# Patient Record
Sex: MALE | Race: WHITE | NOT HISPANIC OR LATINO | Employment: OTHER | URBAN - METROPOLITAN AREA
[De-identification: names, ages, dates, MRNs, and addresses within clinical notes are randomized per-mention and may not be internally consistent; named-entity substitution may affect disease eponyms.]

---

## 2019-07-05 ENCOUNTER — TELEPHONE (OUTPATIENT)
Dept: FAMILY MEDICINE CLINIC | Facility: CLINIC | Age: 74
End: 2019-07-05

## 2019-07-05 DIAGNOSIS — R19.5 FECAL OCCULT BLOOD TEST POSITIVE: Primary | ICD-10-CM

## 2019-07-05 RX ORDER — DULAGLUTIDE 1.5 MG/.5ML
INJECTION, SOLUTION SUBCUTANEOUS
Refills: 0 | COMMUNITY
Start: 2019-05-10 | End: 2019-07-16 | Stop reason: SINTOL

## 2019-07-05 RX ORDER — PANTOPRAZOLE SODIUM 40 MG/1
TABLET, DELAYED RELEASE ORAL
Refills: 3 | COMMUNITY
Start: 2019-05-09 | End: 2020-01-17 | Stop reason: ALTCHOICE

## 2019-07-05 RX ORDER — FLASH GLUCOSE SCANNING READER
EACH MISCELLANEOUS
Refills: 0 | COMMUNITY
Start: 2019-05-09

## 2019-07-05 RX ORDER — FLASH GLUCOSE SENSOR
KIT MISCELLANEOUS
Refills: 5 | COMMUNITY
Start: 2019-06-25 | End: 2019-09-20 | Stop reason: SDUPTHER

## 2019-07-05 RX ORDER — AMLODIPINE BESYLATE 5 MG/1
TABLET ORAL
Refills: 3 | COMMUNITY
Start: 2019-05-09 | End: 2020-02-07 | Stop reason: ALTCHOICE

## 2019-07-05 RX ORDER — LEVOTHYROXINE SODIUM 0.15 MG/1
TABLET ORAL
Refills: 3 | COMMUNITY
Start: 2019-05-09 | End: 2020-05-26

## 2019-07-05 RX ORDER — METOPROLOL TARTRATE 100 MG/1
TABLET ORAL
Refills: 3 | COMMUNITY
Start: 2019-05-30 | End: 2019-08-26 | Stop reason: SDUPTHER

## 2019-07-05 RX ORDER — GLIPIZIDE 10 MG/1
TABLET ORAL
Refills: 3 | COMMUNITY
Start: 2019-05-09 | End: 2020-01-17

## 2019-07-05 RX ORDER — TRAMADOL HYDROCHLORIDE 50 MG/1
TABLET ORAL
Refills: 5 | COMMUNITY
Start: 2019-06-22 | End: 2019-07-16 | Stop reason: SDUPTHER

## 2019-07-05 RX ORDER — CLOTRIMAZOLE AND BETAMETHASONE DIPROPIONATE 10; .64 MG/G; MG/G
CREAM TOPICAL
Refills: 3 | COMMUNITY
Start: 2019-07-01 | End: 2019-07-16 | Stop reason: ALTCHOICE

## 2019-07-16 ENCOUNTER — OFFICE VISIT (OUTPATIENT)
Dept: FAMILY MEDICINE CLINIC | Facility: CLINIC | Age: 74
End: 2019-07-16
Payer: MEDICARE

## 2019-07-16 VITALS
HEART RATE: 71 BPM | DIASTOLIC BLOOD PRESSURE: 80 MMHG | BODY MASS INDEX: 34.21 KG/M2 | OXYGEN SATURATION: 97 % | RESPIRATION RATE: 22 BRPM | HEIGHT: 67 IN | SYSTOLIC BLOOD PRESSURE: 148 MMHG | TEMPERATURE: 97.8 F | WEIGHT: 218 LBS

## 2019-07-16 DIAGNOSIS — Z86.010 HX OF COLONIC POLYPS: Primary | ICD-10-CM

## 2019-07-16 DIAGNOSIS — E66.09 CLASS 1 OBESITY DUE TO EXCESS CALORIES WITH SERIOUS COMORBIDITY AND BODY MASS INDEX (BMI) OF 34.0 TO 34.9 IN ADULT: ICD-10-CM

## 2019-07-16 DIAGNOSIS — R21 GROIN RASH: ICD-10-CM

## 2019-07-16 DIAGNOSIS — G89.29 CHRONIC LEFT-SIDED LOW BACK PAIN WITH LEFT-SIDED SCIATICA: ICD-10-CM

## 2019-07-16 DIAGNOSIS — I25.2 HISTORY OF ACUTE MYOCARDIAL INFARCTION: ICD-10-CM

## 2019-07-16 DIAGNOSIS — E11.65 TYPE 2 DIABETES MELLITUS WITH HYPERGLYCEMIA, WITHOUT LONG-TERM CURRENT USE OF INSULIN (HCC): ICD-10-CM

## 2019-07-16 DIAGNOSIS — M54.42 CHRONIC LEFT-SIDED LOW BACK PAIN WITH LEFT-SIDED SCIATICA: ICD-10-CM

## 2019-07-16 PROBLEM — M54.40 CHRONIC LEFT-SIDED LOW BACK PAIN WITH SCIATICA: Status: ACTIVE | Noted: 2019-07-16

## 2019-07-16 PROBLEM — E66.811 CLASS 1 OBESITY DUE TO EXCESS CALORIES WITH SERIOUS COMORBIDITY AND BODY MASS INDEX (BMI) OF 34.0 TO 34.9 IN ADULT: Status: ACTIVE | Noted: 2019-07-16

## 2019-07-16 PROBLEM — Z86.0100 HX OF COLONIC POLYPS: Status: ACTIVE | Noted: 2019-07-16

## 2019-07-16 PROCEDURE — 99214 OFFICE O/P EST MOD 30 MIN: CPT | Performed by: FAMILY MEDICINE

## 2019-07-16 RX ORDER — TRAMADOL HYDROCHLORIDE 50 MG/1
TABLET ORAL
Qty: 120 TABLET | Refills: 0 | Status: SHIPPED | OUTPATIENT
Start: 2019-07-16 | End: 2019-08-16 | Stop reason: SDUPTHER

## 2019-07-16 RX ORDER — VITAMIN E 268 MG
400 CAPSULE ORAL DAILY
COMMUNITY
End: 2019-07-16 | Stop reason: ALTCHOICE

## 2019-07-16 RX ORDER — MELATONIN
2000 DAILY
COMMUNITY

## 2019-07-16 RX ORDER — ASPIRIN 325 MG
325 TABLET ORAL DAILY
COMMUNITY

## 2019-07-16 RX ORDER — PYRIDOXINE HCL (VITAMIN B6) 100 MG
100 TABLET ORAL DAILY
COMMUNITY
End: 2019-07-16 | Stop reason: ALTCHOICE

## 2019-07-16 RX ORDER — CLOTRIMAZOLE AND BETAMETHASONE DIPROPIONATE 10; .64 MG/G; MG/G
CREAM TOPICAL
Qty: 30 G | Refills: 3 | Status: SHIPPED | OUTPATIENT
Start: 2019-07-16

## 2019-07-16 NOTE — PROGRESS NOTES
Chief concern and HPI: Rojean Severe is a 69 y o  male  F/u chronic disease  Has DM and refuses to take insulin  Dropped Trulicity  In last few months because of perceived problems with it  Felt N/V/D on Trulicity so stopped it  Blood sugars recently 100 - 200's       Previous relevant clinical information reviewed from medical, surgical and psychosocial history, medication and allergies and labs/studies  Review of systems: No new or worsening:   Unexplained fever/chills/sweats/weight loss  HEENT issues such as new ear, mouth, nose or eye problems  Respiratory issues such as new shortness of breath, cough  Heart issues such as new chest pain or pressure, palpitations  Abdominal or digestive issues such as diarrhea, constipation or blood in stool  BM's are normal  Genitourinary issues  Neurological issues such as new numbness or motor weakness  Psychological issues such as depression or anxiety  Skin issues such as new rashes      Exam:  Alert, no acute distress /80 (BP Location: Left arm, Patient Position: Sitting, Cuff Size: Adult)   Pulse 71   Temp 97 8 °F (36 6 °C) (Tympanic)   Resp 22   Ht 5' 6 5" (1 689 m)   Wt 98 9 kg (218 lb)   SpO2 97%   BMI 34 66 kg/m²   HEENT: Head normocephalic and atraumatic  Lungs: No respiratory labor  Clear to auscultation  Cardiac: Regular rate and rhythm  No murmur, rub or gallop  Abdomen: Benign  Normal active bowel sounds  Soft and non-tender  No organomegaly  Extremities: No cyanosis, clubbing or edema  Foot exam no ulcers  + decreased vibration and mild decreased monofilament  Neuro screen: No gross deficits      Summary Impression:  1  Hx of colonic polyps  Refused colonscopy    2  History of acute myocardial infarction  Consider statin after labs back    3  Class 1 obesity due to excess calories with serious comorbidity and body mass index (BMI) of 34 0 to 34 9 in adult  Discuss healthy diet next visit    4   Chronic left-sided low back pain with left-sided sciatica  On Tramadol    5  Type 2 diabetes mellitus with hyperglycemia, without long-term current use of insulin ([facility] [address] )  labs          Risks and benefits of therapeutic plan discussed, answered all patient questions and concerns and patient expressed understanding and agreement of therapeutic plan  BMI Counseling: Body mass index is 34 66 kg/m²  Discussed the patient's BMI with him  The BMI is above average  BMI counseling and education was provided to the patient  Nutrition recommendations include 3-5 servings of fruits/vegetables daily        Follow up plan: 1 month review labs, decide if needs Statin, etc

## 2019-07-19 ENCOUNTER — APPOINTMENT (OUTPATIENT)
Dept: LAB | Facility: CLINIC | Age: 74
End: 2019-07-19
Payer: MEDICARE

## 2019-07-19 DIAGNOSIS — E11.65 TYPE 2 DIABETES MELLITUS WITH HYPERGLYCEMIA, WITHOUT LONG-TERM CURRENT USE OF INSULIN (HCC): ICD-10-CM

## 2019-07-19 LAB
ALBUMIN SERPL BCP-MCNC: 3.4 G/DL (ref 3.5–5)
ALP SERPL-CCNC: 39 U/L (ref 46–116)
ALT SERPL W P-5'-P-CCNC: 42 U/L (ref 12–78)
ANION GAP SERPL CALCULATED.3IONS-SCNC: 3 MMOL/L (ref 4–13)
AST SERPL W P-5'-P-CCNC: 28 U/L (ref 5–45)
BILIRUB SERPL-MCNC: 0.51 MG/DL (ref 0.2–1)
BUN SERPL-MCNC: 35 MG/DL (ref 5–25)
CALCIUM SERPL-MCNC: 8.9 MG/DL (ref 8.3–10.1)
CHLORIDE SERPL-SCNC: 107 MMOL/L (ref 100–108)
CHOLEST SERPL-MCNC: 180 MG/DL (ref 50–200)
CO2 SERPL-SCNC: 27 MMOL/L (ref 21–32)
CREAT SERPL-MCNC: 2.12 MG/DL (ref 0.6–1.3)
CREAT UR-MCNC: 285 MG/DL
EST. AVERAGE GLUCOSE BLD GHB EST-MCNC: 212 MG/DL
GFR SERPL CREATININE-BSD FRML MDRD: 30 ML/MIN/1.73SQ M
GLUCOSE P FAST SERPL-MCNC: 79 MG/DL (ref 65–99)
HBA1C MFR BLD: 9 % (ref 4.2–6.3)
HDLC SERPL-MCNC: 23 MG/DL (ref 40–60)
LDLC SERPL CALC-MCNC: 108 MG/DL (ref 0–100)
MICROALBUMIN UR-MCNC: 883 MG/L (ref 0–20)
MICROALBUMIN/CREAT 24H UR: 310 MG/G CREATININE (ref 0–30)
NONHDLC SERPL-MCNC: 157 MG/DL
POTASSIUM SERPL-SCNC: 4.5 MMOL/L (ref 3.5–5.3)
PROT SERPL-MCNC: 7 G/DL (ref 6.4–8.2)
SODIUM SERPL-SCNC: 137 MMOL/L (ref 136–145)
TRIGL SERPL-MCNC: 243 MG/DL

## 2019-07-19 PROCEDURE — 80053 COMPREHEN METABOLIC PANEL: CPT

## 2019-07-19 PROCEDURE — 80061 LIPID PANEL: CPT

## 2019-07-19 PROCEDURE — 83036 HEMOGLOBIN GLYCOSYLATED A1C: CPT

## 2019-07-19 PROCEDURE — 82570 ASSAY OF URINE CREATININE: CPT | Performed by: FAMILY MEDICINE

## 2019-07-19 PROCEDURE — 36415 COLL VENOUS BLD VENIPUNCTURE: CPT

## 2019-07-19 PROCEDURE — 82043 UR ALBUMIN QUANTITATIVE: CPT | Performed by: FAMILY MEDICINE

## 2019-07-31 ENCOUNTER — TELEPHONE (OUTPATIENT)
Dept: GASTROENTEROLOGY | Facility: AMBULARY SURGERY CENTER | Age: 74
End: 2019-07-31

## 2019-08-16 DIAGNOSIS — M54.42 CHRONIC LEFT-SIDED LOW BACK PAIN WITH LEFT-SIDED SCIATICA: ICD-10-CM

## 2019-08-16 DIAGNOSIS — G89.29 CHRONIC LEFT-SIDED LOW BACK PAIN WITH LEFT-SIDED SCIATICA: ICD-10-CM

## 2019-08-16 RX ORDER — TRAMADOL HYDROCHLORIDE 50 MG/1
TABLET ORAL
Qty: 120 TABLET | Refills: 0 | Status: SHIPPED | OUTPATIENT
Start: 2019-08-16 | End: 2019-09-20 | Stop reason: SDUPTHER

## 2019-08-16 RX ORDER — TRAMADOL HYDROCHLORIDE 50 MG/1
TABLET ORAL
Qty: 120 TABLET | Refills: 0 | OUTPATIENT
Start: 2019-08-16

## 2019-08-19 ENCOUNTER — DOCUMENTATION (OUTPATIENT)
Dept: FAMILY MEDICINE CLINIC | Facility: CLINIC | Age: 74
End: 2019-08-19

## 2019-08-19 ENCOUNTER — OFFICE VISIT (OUTPATIENT)
Dept: FAMILY MEDICINE CLINIC | Facility: CLINIC | Age: 74
End: 2019-08-19
Payer: MEDICARE

## 2019-08-19 VITALS
OXYGEN SATURATION: 99 % | SYSTOLIC BLOOD PRESSURE: 118 MMHG | TEMPERATURE: 97.5 F | BODY MASS INDEX: 34.34 KG/M2 | WEIGHT: 216 LBS | DIASTOLIC BLOOD PRESSURE: 58 MMHG | HEART RATE: 54 BPM | RESPIRATION RATE: 18 BRPM

## 2019-08-19 DIAGNOSIS — F32.A ANXIETY AND DEPRESSION: ICD-10-CM

## 2019-08-19 DIAGNOSIS — G89.29 CHRONIC LEFT-SIDED LOW BACK PAIN WITH LEFT-SIDED SCIATICA: ICD-10-CM

## 2019-08-19 DIAGNOSIS — E11.65 TYPE 2 DIABETES MELLITUS WITH HYPERGLYCEMIA, WITHOUT LONG-TERM CURRENT USE OF INSULIN (HCC): Primary | ICD-10-CM

## 2019-08-19 DIAGNOSIS — E66.09 CLASS 1 OBESITY DUE TO EXCESS CALORIES WITH SERIOUS COMORBIDITY AND BODY MASS INDEX (BMI) OF 34.0 TO 34.9 IN ADULT: ICD-10-CM

## 2019-08-19 DIAGNOSIS — N18.30 CKD (CHRONIC KIDNEY DISEASE) STAGE 3, GFR 30-59 ML/MIN (HCC): ICD-10-CM

## 2019-08-19 DIAGNOSIS — E63.9 NUTRITION IMPAIRED DUE TO IMBALANCE OF NUTRIENTS: ICD-10-CM

## 2019-08-19 DIAGNOSIS — Z86.010 HX OF COLONIC POLYPS: ICD-10-CM

## 2019-08-19 DIAGNOSIS — I25.2 HISTORY OF ACUTE MYOCARDIAL INFARCTION: ICD-10-CM

## 2019-08-19 DIAGNOSIS — F41.9 ANXIETY AND DEPRESSION: ICD-10-CM

## 2019-08-19 DIAGNOSIS — M54.42 CHRONIC LEFT-SIDED LOW BACK PAIN WITH LEFT-SIDED SCIATICA: ICD-10-CM

## 2019-08-19 DIAGNOSIS — Z23 ENCOUNTER FOR IMMUNIZATION: ICD-10-CM

## 2019-08-19 PROCEDURE — 99214 OFFICE O/P EST MOD 30 MIN: CPT | Performed by: FAMILY MEDICINE

## 2019-08-19 PROCEDURE — 90746 HEPB VACCINE 3 DOSE ADULT IM: CPT

## 2019-08-19 PROCEDURE — G0010 ADMIN HEPATITIS B VACCINE: HCPCS

## 2019-08-19 PROCEDURE — 96372 THER/PROPH/DIAG INJ SC/IM: CPT | Performed by: FAMILY MEDICINE

## 2019-08-19 NOTE — PATIENT INSTRUCTIONS
[id number]  Cook 1 meal each week out of cookbook, bookmark one's you like    Do NOT take NSAIDs    Make an appt with Dr Hailey Kimr   Aminah Maddox on Trauma focused therapy

## 2019-08-19 NOTE — PROGRESS NOTES
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  Some flashbacks  Vivid dreams  Has taught himself how to wake himself out of dream      Not high motivation to live but not active SI  Has appt with GI 9/14  Because of past bad experience, refuses colonoscopy    Previous relevant clinical information reviewed from medical, surgical and psychosocial history, medication and allergies and labs/studies  Patient Active Problem List   Diagnosis    History of acute myocardial infarction    Type 2 diabetes mellitus with hyperglycemia, without long-term current use of insulin ([facility] [address] )    Chronic left-sided low back pain with sciatica    Class 1 obesity due to excess calories with serious comorbidity and body mass index (BMI) of 34 0 to 34 9 in adult    Hx of colonic polyps    CKD (chronic kidney disease) stage 3, GFR 30-59 ml/min ([facility])       Review of systems: No new or worsening:   Unexplained fever/chills/sweats/weight loss  HEENT issues such as new ear, mouth, nose or eye problems  Respiratory issues such as new shortness of breath, cough  Heart issues such as new chest pain or pressure, palpitations  Genitourinary issues  Neurological issues such as new numbness or motor weakness    Skin issues such as new rashes      Exam:  Alert, no acute distress /58   Pulse (!) 54   Temp 97 5 °F (36 4 °C)   Resp 18   Wt 98 kg (216 lb)   SpO2 99%   BMI 34 34 kg/m²   HEENT: Head normocephalic and atraumatic  Lungs: No respiratory labor  Clear to auscultation  Cardiac: Regular rate and rhythm  No murmur, rub or gallop  Abdomen: Benign  Normal active bowel sounds  Soft and non-tender   No organomegaly  Extremities: No cyanosis, clubbing or edema  Neuro screen: No gross deficits    Diabetic Foot Exam    Patient's shoes and socks removed  Right Foot/Ankle   Right Foot Inspection  Skin Exam: skin normal and skin intact no dry skin, no warmth, no callus, no erythema, no maceration, no abnormal color, no pre-ulcer, no ulcer and no callus                          Toe Exam: ROM and strength within normal limits  Sensory   Vibration: diminished  Proprioception: intact   Monofilament testing: intact  Vascular  Capillary refills: < 3 seconds  The right DP pulse is 2+  The right PT pulse is 2+  Left Foot/Ankle  Left Foot Inspection  Skin Exam: skin normal and skin intactno dry skin, no warmth, no erythema, no maceration, normal color, no pre-ulcer, no ulcer and no callus                         Toe Exam: ROM and strength within normal limits                   Sensory   Vibration: diminished  Proprioception: intact  Monofilament: intact  Vascular  Capillary refills: < 3 seconds  The left DP pulse is 2+  The left PT pulse is 2+  Assign Risk Category:  No deformity present; Loss of protective sensation; No weak pulses       Risk: 1        Summary Impression:  1  Type 2 diabetes mellitus with hyperglycemia, without long-term current use of insulin ([facility] [address] )  Labs  Is making progress - BS's used to be 500's and higher  Last eye exam 3 years ago and a month ago  Vision stable  Feels very suspicious about meds  Start Sitagliptin  Possible SEs reviewed and all questions answered  2  Chronic left-sided low back pain with left-sided sciatica  Uses Tramadol only at night  Takes 4 pills all at one time  3  CKD (chronic kidney disease) stage 3, GFR 30-59 ml/min ([facility])  Monitor closely  Do NOT take NSAIDs    4  Class 1 obesity due to excess calories with serious comorbidity and body mass index (BMI) of 34 0 to 34 9 in adult  Reviewed healthy lifestyle and set goals  More veggies    5  Hx of colonic polyps  + Guiac  Will see GI      6  History of acute myocardial infarction  Clinically stable    7  Depression/Anxiety/possible PTSD:  Make an appt with Dr Reyna Shine on Trauma focused therapy        Risks and benefits of therapeutic plan discussed, answered all patient questions and concerns and patient expressed understanding and agreement of therapeutic plan  BMI Counseling: There is no height or weight on file to calculate BMI  Discussed the patient's BMI with him  The BMI is above average  BMI counseling and education was provided to the patient  Nutrition recommendations include 3-5 servings of fruits/vegetables daily        Follow up plan: 1 month

## 2019-08-21 ENCOUNTER — SOCIAL WORK (OUTPATIENT)
Dept: FAMILY MEDICINE CLINIC | Facility: CLINIC | Age: 74
End: 2019-08-21
Payer: MEDICARE

## 2019-08-21 DIAGNOSIS — F43.10 PTSD (POST-TRAUMATIC STRESS DISORDER): ICD-10-CM

## 2019-08-21 DIAGNOSIS — F32.A MILD EPISODE OF DEPRESSION: Primary | ICD-10-CM

## 2019-08-21 PROCEDURE — 99205 OFFICE O/P NEW HI 60 MIN: CPT | Performed by: SOCIAL WORKER

## 2019-08-21 NOTE — PROGRESS NOTES
Assessment/Plan:      There are no diagnoses linked to this encounter  Subjective:     Patient ID: Manjinder Ku is a 69 y o  male      HPI    Review of Systems      Objective:     Physical Exam

## 2019-08-26 DIAGNOSIS — F41.9 ANXIETY AND DEPRESSION: ICD-10-CM

## 2019-08-26 DIAGNOSIS — E11.65 TYPE 2 DIABETES MELLITUS WITH HYPERGLYCEMIA, WITHOUT LONG-TERM CURRENT USE OF INSULIN (HCC): Primary | ICD-10-CM

## 2019-08-26 DIAGNOSIS — F32.A ANXIETY AND DEPRESSION: ICD-10-CM

## 2019-08-26 DIAGNOSIS — I25.2 HISTORY OF ACUTE MYOCARDIAL INFARCTION: ICD-10-CM

## 2019-08-27 RX ORDER — METOPROLOL TARTRATE 100 MG/1
TABLET ORAL
Qty: 270 TABLET | Refills: 0 | Status: SHIPPED | OUTPATIENT
Start: 2019-08-27 | End: 2020-04-20 | Stop reason: HOSPADM

## 2019-08-28 ENCOUNTER — SOCIAL WORK (OUTPATIENT)
Dept: FAMILY MEDICINE CLINIC | Facility: CLINIC | Age: 74
End: 2019-08-28
Payer: MEDICARE

## 2019-08-28 DIAGNOSIS — F32.A DEPRESSION, UNSPECIFIED DEPRESSION TYPE: Primary | ICD-10-CM

## 2019-08-28 PROCEDURE — 90836 PSYTX W PT W E/M 45 MIN: CPT | Performed by: SOCIAL WORKER

## 2019-09-03 ENCOUNTER — APPOINTMENT (OUTPATIENT)
Dept: LAB | Facility: CLINIC | Age: 74
End: 2019-09-03
Payer: MEDICARE

## 2019-09-03 DIAGNOSIS — E11.65 TYPE 2 DIABETES MELLITUS WITH HYPERGLYCEMIA, WITHOUT LONG-TERM CURRENT USE OF INSULIN (HCC): ICD-10-CM

## 2019-09-03 LAB
ANION GAP SERPL CALCULATED.3IONS-SCNC: 5 MMOL/L (ref 4–13)
BUN SERPL-MCNC: 31 MG/DL (ref 5–25)
CALCIUM SERPL-MCNC: 8.6 MG/DL (ref 8.3–10.1)
CHLORIDE SERPL-SCNC: 107 MMOL/L (ref 100–108)
CO2 SERPL-SCNC: 26 MMOL/L (ref 21–32)
CREAT SERPL-MCNC: 1.98 MG/DL (ref 0.6–1.3)
CREAT UR-MCNC: 117 MG/DL
GFR SERPL CREATININE-BSD FRML MDRD: 33 ML/MIN/1.73SQ M
GLUCOSE P FAST SERPL-MCNC: 92 MG/DL (ref 65–99)
MICROALBUMIN UR-MCNC: 713 MG/L (ref 0–20)
MICROALBUMIN/CREAT 24H UR: 609 MG/G CREATININE (ref 0–30)
POTASSIUM SERPL-SCNC: 4.5 MMOL/L (ref 3.5–5.3)
SODIUM SERPL-SCNC: 138 MMOL/L (ref 136–145)

## 2019-09-03 PROCEDURE — 82043 UR ALBUMIN QUANTITATIVE: CPT | Performed by: FAMILY MEDICINE

## 2019-09-03 PROCEDURE — 36415 COLL VENOUS BLD VENIPUNCTURE: CPT

## 2019-09-03 PROCEDURE — 80048 BASIC METABOLIC PNL TOTAL CA: CPT

## 2019-09-03 PROCEDURE — 82570 ASSAY OF URINE CREATININE: CPT | Performed by: FAMILY MEDICINE

## 2019-09-04 ENCOUNTER — CONSULT (OUTPATIENT)
Dept: GASTROENTEROLOGY | Facility: CLINIC | Age: 74
End: 2019-09-04
Payer: MEDICARE

## 2019-09-04 VITALS
HEIGHT: 67 IN | HEART RATE: 62 BPM | TEMPERATURE: 98.2 F | RESPIRATION RATE: 18 BRPM | DIASTOLIC BLOOD PRESSURE: 70 MMHG | BODY MASS INDEX: 34.21 KG/M2 | WEIGHT: 218 LBS | SYSTOLIC BLOOD PRESSURE: 118 MMHG

## 2019-09-04 DIAGNOSIS — R19.5 FECAL OCCULT BLOOD TEST POSITIVE: ICD-10-CM

## 2019-09-04 PROCEDURE — 99204 OFFICE O/P NEW MOD 45 MIN: CPT | Performed by: INTERNAL MEDICINE

## 2019-09-04 NOTE — PROGRESS NOTES
Consultation - [facility]) [facility]  Judge Logan 1945 male         Chief Complaint:  Dark stool    HPI:  21-year-old male with history of coronary artery disease status post MI, diabetes mellitus, chronic kidney disease, depression and anxiety reports having dark colored stool with episodes of diarrhea when he was on Trulicity that he stopped about 3 months ago  He was told about heme-positive stool  He reports doing well since he stopped the medication and denies any more dark-colored stool  Regular soft brown bowel movements and denies any blood or mucus in the stool  He was followed by Dr Faith Rendon for many years but he switched to Dr Wander Paez couple of months ago  Denies abdominal pain, nausea or vomiting  Good appetite, no recent weight loss  Denies any heartburn or acid reflux but he has been on pantoprazole  Denies any difficulty swallowing  He had colonoscopy 25 years ago and was told about colon polyps  He has been refusing colonoscopies for many years  REVIEW OF SYSTEMS: Review of Systems   Constitutional: Negative for activity change, appetite change, chills, diaphoresis, fatigue and unexpected weight change  HENT: Negative for ear discharge, ear pain, facial swelling, hearing loss, nosebleeds, sore throat, tinnitus and voice change  Eyes: Negative for pain, discharge, redness, itching and visual disturbance  Respiratory: Negative for apnea, cough, chest tightness, shortness of breath and wheezing  Cardiovascular: Negative for chest pain and palpitations  Gastrointestinal:        As noted in HPI   Endocrine: Negative for cold intolerance, heat intolerance and polyuria  Genitourinary: Negative for difficulty urinating, flank pain and urgency  Musculoskeletal: Positive for back pain  Negative for gait problem and joint swelling  Skin: Negative for rash and wound     Neurological: Negative for dizziness, tremors, seizures, speech difficulty, light-headedness and numbness  Hematological: Negative for adenopathy  Does not bruise/bleed easily  Psychiatric/Behavioral: Negative for agitation, behavioral problems and confusion  The patient is not nervous/anxious  Past Medical History:   Diagnosis Date    Diabetes mellitus ([facility] [address] )     Myocardial infarction [facility])       Past Surgical History:   Procedure Laterality Date    ROTATOR CUFF REPAIR       Social History     Socioeconomic History    Marital status:      Spouse name: Not on file    Number of children: Not on file    Years of education: Not on file    Highest education level: Not on file   Occupational History    Not on file   Social Needs    Financial resource strain: Not on file    Food insecurity:     Worry: Not on file     Inability: Not on file    Transportation needs:     Medical: Not on file     Non-medical: Not on file   Tobacco Use    Smoking status: Never Smoker    Smokeless tobacco: Never Used   Substance and Sexual Activity    Alcohol use: Yes     Frequency: 2-4 times a month     Drinks per session: 1 or 2    Drug use: Never    Sexual activity: Not on file   Lifestyle    Physical activity:     Days per week: Not on file     Minutes per session: Not on file    Stress: Not on file   Relationships    Social connections:     Talks on phone: Not on file     Gets together: Not on file     Attends Rastafarian service: Not on file     Active member of club or organization: Not on file     Attends meetings of clubs or organizations: Not on file     Relationship status: Not on file    Intimate partner violence:     Fear of current or ex partner: Not on file     Emotionally abused: Not on file     Physically abused: Not on file     Forced sexual activity: Not on file   Other Topics Concern    Not on file   Social History Narrative    Not on file     No family history on file  Patient has no known allergies    Current Outpatient Medications   Medication Sig Dispense Refill    amLODIPine (NORVASC) 5 mg tablet TK 1 T PO ONCE A DAY  3    aspirin 325 mg tablet Take 325 mg by mouth daily      cholecalciferol (VITAMIN D3) 1,000 units tablet Take 2,000 Units by mouth daily      clotrimazole-betamethasone (LOTRISONE) 1-0 05 % cream Apply to groin rash daily prn 30 g 3    Continuous Blood Gluc  (FREESTYLE JUVENTINO 14 DAY READER) EMMA U UTD  0    Continuous Blood Gluc Sensor (FREESTYLE JUVENTINO 14 DAY SENSOR) MISC U UTD  5    glipiZIDE (GLUCOTROL) 10 mg tablet TK 1 T PO BID  3    levothyroxine 150 mcg tablet TK 1 T PO ONCE A DAY  3    metoprolol tartrate (LOPRESSOR) 100 mg tablet TAKE 1 AND 1/2 TABLETS BY MOUTH TWICE DAILY AS DIRECTED 270 tablet 0    pantoprazole (PROTONIX) 40 mg tablet TK 1 T PO ONCE A DAY  3    sitaGLIPtin (JANUVIA) 50 mg tablet Take 1 tablet (50 mg total) by mouth daily 90 tablet 1    traMADol (ULTRAM) 50 mg tablet 4 pills at bedtime prn 120 tablet 0     No current facility-administered medications for this visit  Blood pressure 118/70, pulse 62, temperature 98 2 °F (36 8 °C), temperature source Tympanic, resp  rate 18, height 5' 6 5" (1 689 m), weight 98 9 kg (218 lb)  PHYSICAL EXAM: Physical Exam   Constitutional: He is oriented to person, place, and time  He appears well-developed  HENT:   Head: Normocephalic and atraumatic  Mouth/Throat: Oropharynx is clear and moist    Eyes: Pupils are equal, round, and reactive to light  Conjunctivae are normal  Right eye exhibits no discharge  Left eye exhibits no discharge  No scleral icterus  Neck: Neck supple  No JVD present  No tracheal deviation present  No thyromegaly present  Cardiovascular: Normal rate, regular rhythm, normal heart sounds and intact distal pulses  Exam reveals no gallop and no friction rub  No murmur heard  Pulmonary/Chest: Effort normal and breath sounds normal  No respiratory distress  He has no wheezes  He has no rales  He exhibits no tenderness  Abdominal: Soft   Bowel sounds are normal  He exhibits no distension and no mass  There is no tenderness  There is no rebound and no guarding  No hernia  Musculoskeletal: He exhibits no edema  Lymphadenopathy:     He has no cervical adenopathy  Neurological: He is alert and oriented to person, place, and time  Skin: Skin is warm and dry  No rash noted  No erythema  Psychiatric: He has a normal mood and affect  His behavior is normal  Thought content normal         No results found for: WBC, HGB, HCT, MCV, PLT  Lab Results   Component Value Date    CALCIUM 8 6 09/03/2019    K 4 5 09/03/2019    CO2 26 09/03/2019     09/03/2019    BUN 31 (H) 09/03/2019    CREATININE 1 98 (H) 09/03/2019     Lab Results   Component Value Date    ALT 42 07/19/2019    AST 28 07/19/2019    ALKPHOS 39 (L) 07/19/2019     No results found for: INR, PROTIME    Patient was never admitted  ASSESSMENT & PLAN:    Fecal occult blood test positive  Occult GI bleeding when he had black colored stool that he attributes to Trulicity  Rule out colorectal lesions including polyps or malignancy  Rule out upper GI causes also     -had a long discussion with patient about the importance of EGD and colonoscopy but he is strongly refusing and very adamant about not having the procedures  He understands the possibility of malignancy     -discussed about long-term side effects from pantoprazole and advised him to stop the medication    He may try Pepcid or Zantac for any symptoms of acid reflux     -follow up with his PCP

## 2019-09-04 NOTE — ASSESSMENT & PLAN NOTE
Occult GI bleeding when he had black colored stool that he attributes to Trulicity  Rule out colorectal lesions including polyps or malignancy  Rule out upper GI causes also     -had a long discussion with patient about the importance of EGD and colonoscopy but he is strongly refusing and very adamant about not having the procedures  He understands the possibility of malignancy     -discussed about long-term side effects from pantoprazole and advised him to stop the medication    He may try Pepcid or Zantac for any symptoms of acid reflux     -follow up with his PCP

## 2019-09-16 ENCOUNTER — HOSPITAL ENCOUNTER (EMERGENCY)
Facility: HOSPITAL | Age: 74
Discharge: HOME | End: 2019-09-16
Attending: EMERGENCY MEDICINE | Admitting: EMERGENCY MEDICINE
Payer: MEDICARE

## 2019-09-16 ENCOUNTER — OFFICE VISIT (OUTPATIENT)
Dept: FAMILY MEDICINE CLINIC | Facility: CLINIC | Age: 74
End: 2019-09-16
Payer: MEDICARE

## 2019-09-16 VITALS
TEMPERATURE: 97.7 F | BODY MASS INDEX: 34.66 KG/M2 | OXYGEN SATURATION: 99 % | WEIGHT: 218 LBS | SYSTOLIC BLOOD PRESSURE: 165 MMHG | RESPIRATION RATE: 20 BRPM | HEART RATE: 62 BPM | DIASTOLIC BLOOD PRESSURE: 60 MMHG

## 2019-09-16 VITALS
WEIGHT: 218 LBS | TEMPERATURE: 97.2 F | BODY MASS INDEX: 35.03 KG/M2 | DIASTOLIC BLOOD PRESSURE: 90 MMHG | HEART RATE: 58 BPM | SYSTOLIC BLOOD PRESSURE: 140 MMHG | HEIGHT: 66 IN

## 2019-09-16 DIAGNOSIS — G47.00 INSOMNIA: ICD-10-CM

## 2019-09-16 DIAGNOSIS — IMO0002 MASS: Primary | ICD-10-CM

## 2019-09-16 DIAGNOSIS — R22.9 LOCALIZED SUPERFICIAL SWELLING, MASS, OR LUMP: Primary | ICD-10-CM

## 2019-09-16 PROCEDURE — 99283 EMERGENCY DEPT VISIT LOW MDM: CPT

## 2019-09-16 PROCEDURE — 99213 OFFICE O/P EST LOW 20 MIN: CPT | Performed by: FAMILY MEDICINE

## 2019-09-16 NOTE — PROGRESS NOTES
Chief concern and HPI: Jori Hayden is a 69 y o  male  With left Occipital bump X a day  Some L frontal headache, resolved now  No neurological sx  No trauma  Went to ER and they thought it might be a lipoma  Previous relevant clinical information reviewed from medical, surgical and psychosocial history, medication and allergies and labs/studies  Patient Active Problem List   Diagnosis    History of acute myocardial infarction    Type 2 diabetes mellitus with hyperglycemia, without long-term current use of insulin ([facility] [address] )    Chronic left-sided low back pain with sciatica    Class 1 obesity due to excess calories with serious comorbidity and body mass index (BMI) of 34 0 to 34 9 in adult    Hx of colonic polyps    CKD (chronic kidney disease) stage 3, GFR 30-59 ml/min ([facility])    Anxiety and depression    Nutrition impaired due to imbalance of nutrients    Fecal occult blood test positive           Review of systems: No new or worsening:   Unexplained fever/chills/sweats/weight loss  HEENT issues such as new ear, mouth, nose or eye problems  Respiratory issues such as new shortness of breath, cough  Neurological issues such as new numbness or motor weakness  Psychological issues such as depression or anxiety  Skin issues such as new rashes      Exam:  Alert, no acute distress /90 (BP Location: Left arm, Patient Position: Sitting, Cuff Size: Standard)   Pulse 58   Temp (!) 97 2 °F (36 2 °C)   Ht 5' 6" (1 676 m)   Wt 98 9 kg (218 lb)   BMI 35 19 kg/m²   HEENT: Head normocephalic and atraumatic  L occipital 2 cm bump which is mildly tender  NO redness or drainage  Ears (besides wax) and throat wnl  No local nodes  NO axillary nodes  Dentition baseline poor but no new rotten teeth  Summary Impression:  1  Localized superficial swelling, mass, or lump  Monitor  NO signs of serious cause  Re check on Friday  Report and new or worsened sx           Risks and benefits of therapeutic plan discussed, answered all patient questions and concerns and patient expressed understanding and agreement of therapeutic plan          Follow up plan: 5 days

## 2019-09-16 NOTE — ED PROVIDER NOTES
History  Chief Complaint   Patient presents with    Mass     pt states had a pain in back of head tonight and felt a lump, no known injury, states gets severe pain in area off and on for a few seconds    52-year-old white male presents for evaluation of a tender soft tissue mass base of his occiput  No known trauma, no drainage, bone fever  Feels like a lipoma  52-year-old white male presents evaluation of a lump on the back his head at the area of the occiput  No trauma, patient did not notice before today  Patient states tender to palpation  No fever, no drainage, no change in color  Patient states he notices it most when he lays down because it pressure on it  History provided by:  Patient      Prior to Admission Medications   Prescriptions Last Dose Informant Patient Reported? Taking?    Continuous Blood Gluc  (FREESTYLE JUVENTINO 14 DAY READER) EMMA   Yes No   Sig: U UTD   Continuous Blood Gluc Sensor (FREESTYLE JUVENTINO 14 DAY SENSOR) MISC   Yes No   Sig: U UTD   amLODIPine (NORVASC) 5 mg tablet   Yes No   Sig: TK 1 T PO ONCE A DAY   aspirin 325 mg tablet   Yes No   Sig: Take 325 mg by mouth daily   cholecalciferol (VITAMIN D3) 1,000 units tablet   Yes No   Sig: Take 2,000 Units by mouth daily   clotrimazole-betamethasone (LOTRISONE) 1-0 05 % cream   No No   Sig: Apply to groin rash daily prn   glipiZIDE (GLUCOTROL) 10 mg tablet   Yes No   Sig: TK 1 T PO BID   levothyroxine 150 mcg tablet   Yes No   Sig: TK 1 T PO ONCE A DAY   metoprolol tartrate (LOPRESSOR) 100 mg tablet   No No   Sig: TAKE 1 AND 1/2 TABLETS BY MOUTH TWICE DAILY AS DIRECTED   pantoprazole (PROTONIX) 40 mg tablet   Yes No   Sig: TK 1 T PO ONCE A DAY   sitaGLIPtin (JANUVIA) 50 mg tablet   No No   Sig: Take 1 tablet (50 mg total) by mouth daily   traMADol (ULTRAM) 50 mg tablet   No No   Si pills at bedtime prn      Facility-Administered Medications: None       Past Medical History:   Diagnosis Date    [facility] )  Myocardial infarction [facility])        Past Surgical History:   Procedure Laterality Date    ROTATOR CUFF REPAIR         History reviewed  No pertinent family history  I have reviewed and agree with the history as documented  Social History     Tobacco Use    Smoking status: Never Smoker    Smokeless tobacco: Never Used   Substance Use Topics    Alcohol use: Yes     Frequency: 2-4 times a month     Drinks per session: 1 or 2    Drug use: Never        Review of Systems   Constitutional: Negative  HENT: Negative  Respiratory: Negative  Cardiovascular: Negative  Gastrointestinal: Negative  Genitourinary: Negative  Musculoskeletal: Negative  Skin: Negative for rash and wound  Soft tissue mass   Neurological: Negative  Hematological: Negative  Psychiatric/Behavioral: Negative  All other systems reviewed and are negative  Physical Exam  Physical Exam   Constitutional: He is oriented to person, place, and time  He appears well-developed and well-nourished  HENT:   Head: Normocephalic and atraumatic  Right Ear: External ear normal    Left Ear: External ear normal    Nose: Nose normal    Mouth/Throat: Oropharynx is clear and moist    Lipomatous type lesion the left occipital condyle area  Mild tenderness to palpation, no change in color,   Eyes: Pupils are equal, round, and reactive to light  Conjunctivae and EOM are normal    Neck: Normal range of motion  Neck supple  Cardiovascular: Normal rate, regular rhythm, normal heart sounds and intact distal pulses  Pulmonary/Chest: Effort normal and breath sounds normal  No respiratory distress  He has no wheezes  Abdominal: Soft  Bowel sounds are normal    Musculoskeletal: Normal range of motion  Neurological: He is alert and oriented to person, place, and time  Skin: Skin is warm and dry  Capillary refill takes less than 2 seconds  Psychiatric: He has a normal mood and affect   His behavior is normal  Judgment and thought content normal    Nursing note and vitals reviewed  Vital Signs  ED Triage Vitals [09/16/19 0135]   Temperature Pulse Respirations Blood Pressure SpO2   97 7 °F (36 5 °C) 62 20 165/60 99 %      Temp Source Heart Rate Source Patient Position - Orthostatic VS BP Location FiO2 (%)   Tympanic -- Sitting Right arm --      Pain Score       9           Vitals:    09/16/19 0135   BP: 165/60   Pulse: 62   Patient Position - Orthostatic VS: Sitting         Visual Acuity      ED Medications  Medications - No data to display    Diagnostic Studies  Results Reviewed     None                 No orders to display              Procedures  Procedures       ED Course                               MDM    Disposition  Final diagnoses: Mass   Insomnia     Time reflects when diagnosis was documented in both MDM as applicable and the Disposition within this note     Time User Action Codes Description Comment    9/16/2019  2:12 AM Horowitz Pel Add [R22 9] Mass     9/16/2019  2:12 AM Horowitz Pel Add [G47 00] Insomnia       ED Disposition     ED Disposition Condition Date/Time Comment    Discharge Stable Mon Sep 16, 2019  2:12 AM Adonis Little discharge to home/self care              Follow-up Information     Follow up With Specialties Details Why [address] [facility] [address], MD Family Medicine Go in 3 days As needed [address]   [phone]            Discharge Medication List as of 9/16/2019  2:15 AM      CONTINUE these medications which have NOT CHANGED    Details   amLODIPine (NORVASC) 5 mg tablet TK 1 T PO ONCE A DAY, Historical Med      aspirin 325 mg tablet Take 325 mg by mouth daily, Historical Med      cholecalciferol (VITAMIN D3) 1,000 units tablet Take 2,000 Units by mouth daily, Historical Med      clotrimazole-betamethasone (LOTRISONE) 1-0 05 % cream Apply to groin rash daily prn, Normal      Continuous Blood Gluc  (BettingXpertYLE JUVENTINO 14 DAY READER) EMMA U UTD, Historical Med      Continuous Blood Gluc Sensor (FREESTYLE JUVENTINO 14 DAY SENSOR) MISC U UTD, Historical Med      glipiZIDE (GLUCOTROL) 10 mg tablet TK 1 T PO BID, Historical Med      levothyroxine 150 mcg tablet TK 1 T PO ONCE A DAY, Historical Med      metoprolol tartrate (LOPRESSOR) 100 mg tablet TAKE 1 AND 1/2 TABLETS BY MOUTH TWICE DAILY AS DIRECTED, Normal      pantoprazole (PROTONIX) 40 mg tablet TK 1 T PO ONCE A DAY, Historical Med      sitaGLIPtin (JANUVIA) 50 mg tablet Take 1 tablet (50 mg total) by mouth daily, Starting Mon 8/19/2019, Normal      traMADol (ULTRAM) 50 mg tablet 4 pills at bedtime prn, Normal           No discharge procedures on file      ED Provider  Electronically Signed by           Chetna Oscar MD  09/16/19 [address] [facility] MD [place]  09/16/19 2463       Chetna Oscar MD  09/16/19 2963

## 2019-09-20 ENCOUNTER — OFFICE VISIT (OUTPATIENT)
Dept: FAMILY MEDICINE CLINIC | Facility: CLINIC | Age: 74
End: 2019-09-20
Payer: MEDICARE

## 2019-09-20 VITALS
OXYGEN SATURATION: 99 % | SYSTOLIC BLOOD PRESSURE: 136 MMHG | WEIGHT: 218 LBS | TEMPERATURE: 97.7 F | HEART RATE: 58 BPM | BODY MASS INDEX: 35.03 KG/M2 | RESPIRATION RATE: 18 BRPM | DIASTOLIC BLOOD PRESSURE: 80 MMHG | HEIGHT: 66 IN

## 2019-09-20 DIAGNOSIS — N18.30 CKD (CHRONIC KIDNEY DISEASE) STAGE 3, GFR 30-59 ML/MIN (HCC): ICD-10-CM

## 2019-09-20 DIAGNOSIS — F41.9 ANXIETY AND DEPRESSION: ICD-10-CM

## 2019-09-20 DIAGNOSIS — G89.29 CHRONIC LEFT-SIDED LOW BACK PAIN WITH LEFT-SIDED SCIATICA: ICD-10-CM

## 2019-09-20 DIAGNOSIS — F32.A ANXIETY AND DEPRESSION: ICD-10-CM

## 2019-09-20 DIAGNOSIS — M54.42 CHRONIC LEFT-SIDED LOW BACK PAIN WITH LEFT-SIDED SCIATICA: ICD-10-CM

## 2019-09-20 DIAGNOSIS — E66.09 CLASS 1 OBESITY DUE TO EXCESS CALORIES WITH SERIOUS COMORBIDITY AND BODY MASS INDEX (BMI) OF 34.0 TO 34.9 IN ADULT: ICD-10-CM

## 2019-09-20 DIAGNOSIS — Z23 ENCOUNTER FOR IMMUNIZATION: ICD-10-CM

## 2019-09-20 DIAGNOSIS — R19.5 FECAL OCCULT BLOOD TEST POSITIVE: ICD-10-CM

## 2019-09-20 DIAGNOSIS — F11.20 OPIOID TYPE DEPENDENCE, CONTINUOUS (HCC): Primary | ICD-10-CM

## 2019-09-20 DIAGNOSIS — E63.9 NUTRITION IMPAIRED DUE TO IMBALANCE OF NUTRIENTS: ICD-10-CM

## 2019-09-20 DIAGNOSIS — E11.65 TYPE 2 DIABETES MELLITUS WITH HYPERGLYCEMIA, WITHOUT LONG-TERM CURRENT USE OF INSULIN (HCC): ICD-10-CM

## 2019-09-20 PROCEDURE — 99214 OFFICE O/P EST MOD 30 MIN: CPT | Performed by: FAMILY MEDICINE

## 2019-09-20 PROCEDURE — 90662 IIV NO PRSV INCREASED AG IM: CPT | Performed by: FAMILY MEDICINE

## 2019-09-20 PROCEDURE — G0008 ADMIN INFLUENZA VIRUS VAC: HCPCS | Performed by: FAMILY MEDICINE

## 2019-09-20 RX ORDER — FLASH GLUCOSE SENSOR
KIT MISCELLANEOUS
Qty: 2 EACH | Refills: 11 | Status: SHIPPED | OUTPATIENT
Start: 2019-09-20 | End: 2020-09-02

## 2019-09-20 RX ORDER — TRAMADOL HYDROCHLORIDE 50 MG/1
TABLET ORAL
Qty: 120 TABLET | Refills: 3 | Status: SHIPPED | OUTPATIENT
Start: 2019-09-20 | End: 2019-12-20 | Stop reason: SDUPTHER

## 2019-09-20 RX ORDER — FENOFIBRATE 160 MG/1
160 TABLET ORAL DAILY
Refills: 2 | COMMUNITY
Start: 2019-09-18

## 2019-09-20 NOTE — PATIENT INSTRUCTIONS
Curcumins are potent anti-inflammatory natural medicines derived from turmeric, the common [place] [facility] spice  In order to reduce inflammation in the body, it is important to use brands that are specifically designed to be absorbed from the gut and are reputed to be of good quality, Common uses include for arthritis, autoimmune disorders, chronic pain or the inflammatory component of chronic diseases such as diabetes, heart disease and others  These can be purchased at local healthy food stores such as [employer] or reputable online sites such as www  [employer]  com    Pick one of the following brands:  1  Doctor's Best - 'Best Curcumin C3 Complex with BioPerine' 1 gram twice a day with or without food  2  Nutrigold 'Turmeric Curcumin Gold' 500 mg cap once to three times a day with food  3  Life Extension 'Super Bio-Curcumin' 400 mg 1 - 2 caps daily  4  NutriPhysical Curcumin Extreme 400 mg 1 cap daily  5  Indena S p  A  Meriva 500 mg 1 - 2 caps daily (available at [employer]  com)    A good combination botanical with both Curcumin and Boswellia is [employer]: 1 cap 3x/day also will give you 1000 units of Vit  D3  Boswellia is generally safe and well tolerated, but occasionally can cause GI upset  Generally it is best to take Curcumin with a meal that has some fat (to help absorption), though the above formulations do have improved absorption even if not taken with a meal        Generally these supplements are very safe and well tolerated  Occasionally any supplement can cause stomach upset but this is unusual  Other side effects such as liver irritation are uncommon  Very high doses could thin the blood, so let your doctor know if you are on strong blood thinners such as Warfarin (Coumadin)  Patients with active gallbladder disease (biliary colic) might want to avoid high doses of curcumin     High dose Turmeric may increase urinary oxalate levels, theoretically increasing kidney stone formation is susceptible patients  Curcumin may bind iron, so if iron deficient, take iron and turmeric/curcumin at different times  Curcumin with the bioavailability (absorption) enhancer piperine from black pepper could increase the absorption of certain medications such as phenytoin, rifampin, propranolol, amlodipine, felodipine, nevirapine, so it might be best to take any of these drugs at a different time than Curcumin with Bioperine  All women who are pregnant or breast-feeding should discuss with their doctor before using any supplement or medicine

## 2019-09-20 NOTE — PROGRESS NOTES
Chief concern and HPI: Raphael Mueller is a 69 y o  male  Here for f/u of DM and other chronic issues  Has Josias meter: readings 70 - 200 range, overall much improved  Tends to be low when gets up in AM, but no symptoms  No symptomatic hypoglycemic episodes  'I'm learning the sugar part'  Expresses that it is ok with him if he dies, as long as not in a lot of pain, so isn't really motivated for lifestyle change much  NO actual SI/HI  Previous relevant clinical information reviewed from medical, surgical and psychosocial history, medication and allergies and labs/studies  Patient Active Problem List   Diagnosis    History of acute myocardial infarction    Type 2 diabetes mellitus with hyperglycemia, without long-term current use of insulin ([facility] )    Chronic left-sided low back pain with left-sided sciatica    Class 1 obesity due to excess calories with serious comorbidity and body mass index (BMI) of 34 0 to 34 9 in adult    Hx of colonic polyps    CKD (chronic kidney disease) stage 3, GFR 30-59 ml/min ([facility])    Anxiety and depression    Nutrition impaired due to imbalance of nutrients    Fecal occult blood test positive           Review of systems: No new or worsening:   Unexplained fever/chills/sweats/weight loss  HEENT issues such as new ear, mouth, nose or eye problems  Respiratory issues such as new shortness of breath, cough  Heart issues such as new chest pain or pressure, palpitations  Abdominal or digestive issues such as diarrhea, constipation or blood in stool    BM's are normal  Genitourinary issues  Neurological issues such as new numbness or motor weakness  Skin issues such as new rashes      Exam:  Alert, no acute distress /80 (BP Location: Left arm, Patient Position: Sitting, Cuff Size: Adult)   Pulse 58   Temp 97 7 °F (36 5 °C) (Tympanic)   Resp 18   Ht 5' 6" (1 676 m)   Wt 98 9 kg (218 lb)   SpO2 99%   BMI 35 19 kg/m²   HEENT: Head normocephalic and atraumatic  Lungs: No respiratory labor  Clear to auscultation  Cardiac: Regular rate and rhythm  No murmur, rub or gallop  Abdomen: Benign  Normal active bowel sounds  Soft and non-tender  No organomegaly  Extremities: No cyanosis, clubbing or edema  Neuro screen: No gross deficits    Back pain under good control  Takes Tramadol 4 pills at night at sleep  Summary Impression:  1  Type 2 diabetes mellitus with hyperglycemia, without long-term current use of insulin ([facility])  Control is improving  Before next visit:   - HEMOGLOBIN A1C W/ EAG ESTIMATION; Future  - TSH, 3rd generation with Free T4 reflex; Future    2  Chronic left-sided low back pain with left-sided sciatica  Same Tramadol  Can add Curcumin/Boswellia during a day  3  Anxiety and depression  Continue trauma focused therapy  He states may have had thyroid issues in past:  - TSH, 3rd generation with Free T4 reflex; Future    4  CKD (chronic kidney disease) stage 3, GFR 30-59 ml/min ([facility])  With proteinuria  - HEMOGLOBIN A1C W/ EAG ESTIMATION; Future    5  Nutrition impaired due to imbalance of nutrients  Encouraged healthier lifestyle  - TSH, 3rd generation with Free T4 reflex; Future    6  Class 1 obesity due to excess calories with serious comorbidity and body mass index (BMI) of 34 0 to 34 9 in adult  Encouraged healthier lifestyle  - TSH, 3rd generation with Free T4 reflex; Future    7  Fecal occult blood test positive  No recent blood in stool  Reluctant to do w/u        Risks and benefits of therapeutic plan discussed, answered all patient questions and concerns and patient expressed understanding and agreement of therapeutic plan  BMI Counseling: Body mass index is 35 19 kg/m²  Discussed the patient's BMI with him  The BMI is above normal  Nutrition recommendations include 3-5 servings of fruits/vegetables daily        Follow up plan: 3 months

## 2019-09-21 LAB
6MAM UR QL: NEGATIVE NG/ML
AMPHETAMINES UR QL: NEGATIVE NG/ML
BARBITURATES UR QL: NEGATIVE NG/ML
BENZODIAZ UR QL: NEGATIVE NG/ML
BENZODIAZ UR SCN-MCNC: NORMAL NG/ML
BZE UR QL: NEGATIVE NG/ML
CREAT UR-MCNC: 166.2 MG/DL
ETHANOL UR QL: NEGATIVE NG/ML
METHADONE UR QL: NEGATIVE NG/ML
OPIATES UR QL: NEGATIVE NG/ML
OXIDANTS UR QL: NEGATIVE MCG/ML
OXYCODONE UR QL: NEGATIVE NG/ML
PCP UR QL: NEGATIVE NG/ML
PH UR: 4.66 [PH] (ref 4.5–9)
SL AMB MEDMATCH 6 ACETYLMORPHINE: NORMAL
SL AMB MEDMATCH ALCOHOL METAB: NORMAL
SL AMB MEDMATCH AMPTHETAMINES: NORMAL
SL AMB MEDMATCH BARBITUATES: NORMAL
SL AMB MEDMATCH COCAINE METABOLITE: NORMAL
SL AMB MEDMATCH MARIJUANA METABOLITE: NORMAL
SL AMB MEDMATCH METHADONE METABOLITE: NORMAL
SL AMB MEDMATCH OPIATES: NORMAL
SL AMB MEDMATCH OXYCODONE: NORMAL
SL AMB MEDMATCH PHENCYCLIDINE: NORMAL
THC UR QL: NEGATIVE NG/ML

## 2019-10-02 ENCOUNTER — SOCIAL WORK (OUTPATIENT)
Dept: FAMILY MEDICINE CLINIC | Facility: CLINIC | Age: 74
End: 2019-10-02
Payer: MEDICARE

## 2019-10-02 DIAGNOSIS — F43.10 PTSD (POST-TRAUMATIC STRESS DISORDER): ICD-10-CM

## 2019-10-02 DIAGNOSIS — F32.89 OTHER DEPRESSION: Primary | ICD-10-CM

## 2019-10-02 PROCEDURE — 90836 PSYTX W PT W E/M 45 MIN: CPT | Performed by: SOCIAL WORKER

## 2019-10-25 NOTE — PROGRESS NOTES
Reason for visit: No chief complaint on file  HPI          Review Of Systems:     Mood    Behavior    Thought Content    General {General:29423}   Personality {Personality:99509}   Other Psych Symptoms {Other Psych Symptoms:90196}   Constitutional {Constitutional:65731}   ENT {ENT:40422}   Cardiovascular {Cardiovascular:91683}   Respiratory {Respiratory:25589}   Gastrointestinal {Gastrointestinal:71220}   Genitourinary {Genitourinary:24025}   Musculoskeletal {Musculoskeletal:70372}   Integumentary {Integumentary:95972}   Neurological {Neurological:51783}   Endocrine {Endocrine:80211}   Other Symptoms {Other Symptoms:76630}             Past Psychiatric History:    Progress Note - [place] [place] 82  69 y o  male MRN: [id number]   Unit/Bed#:  Encounter: [id number]    Behavior over the last 24 hours: {EFO improved/regressed/unchanged:97603}  Quoc {EFO Daily Progress UNCHANGED:27480}, {EFO Daily Progress UNCHANGED/IMPROVED:02036}, {EFO Daily Progress UNCHANGED/IMPROVED:51646} today  ***   {EFO MILIEU PARTICIPATION:50828}    Sleep: {EFO sl ip bh sleep:90792}  Appetite: {EFO sl ip bh appetite:37430}  Medication side effects: {EFO EXAM; YES/NO:23694::"No"}   ROS: {EFO ROS Review:77229}    Mental Status Evaluation:    Appearance:  {EFO EXAM; GENERAL PSYCH:70211}   Behavior:  {EFO SL IP Exam; behavior:06260}   Speech:  {EFO SL IP findings; speech:73086}   Mood:  {EFO EXAM; MOOD LESS/MORE:96471}   Affect:  {EFO AFFECT LESS/MORE:12672}   Thought Process:  {EFO MISC; THOUGHT PROCESS:96430}   Associations: {EFO THOUGHT ASSOCIATIONS:77878}   Thought Content:  {EFO SL IP Exam; psych thought content:68968}   Perceptual Disturbances: {EFO Perceptual Disturbances:67430}   Risk Potential: Suicidal ideation - {EFO SI/HI with/without plan:24630}  Homicidal ideation - {EFO HI with/without plan:12829}  Potential for aggression - {Potential for aggression:42528::"No"}   Sensorium:  {EFO ORIENTED/DISORIENTED:35468}   Memory:  {EFO EXAM; PSYCH COGNITION:23016}   Consciousness:  {EFO Consciousness:49783::"alert","awake"}   Attention: {EFO EXAM; NEURO PED ATTENTION:32258}   Insight:  {EFO EXAM; PSYCH INSIGHT/JUDGEMENT:00124}   Judgment: {EFO EXAM; PSYCH INSIGHT/JUDGEMENT:60091}   Gait/Station: {EFO SL IP  Gait/Station:84786}   Motor Activity: {EFO SL IP Psych Motor Activity:14294::"no abnormal movements"}     Vital signs in last 24 hours:    [unfilled]    Laboratory results: {EFO Progress Note Labs:57065::"I have personally reviewed all pertinent laboratory/tests results "}    Progress Toward Goals: {EFO Progress Towards Goal:06890}    Assessment/Plan   Active Problems:    * No active hospital problems  *    Recommended Treatment:     Planned medication and treatment changes:    {EFO PROGRESS PRECAUTIONS/CONSULTS:92992::"All current active medications have been reviewed","Encourage group therapy, milieu therapy and occupational therapy","Behavioral Health checks every 7 minutes"}  {EFO PROGRESS MEDICATION CHANGES:90395}      Risks / Benefits of Treatment:    {Risks, Benefits, and Possible Side Effects of Medications:95698}    Counseling / Coordination of Care:    {EFO Coordination of Care Statements Revised:87383::"Patient's progress discussed with staff in treatment team meeting ","Medications, treatment progress and treatment plan reviewed with patient  "}    Betsey Mis 10/25/19  Assessment/Plan:      There are no diagnoses linked to this encounter  Subjective:     Patient ID: Ector Ramos is a 69 y o  male      HPI    Review of Systems      Objective:     Physical Exam  Procedures      Family Psychiatric History:     Social History:      Social History     Socioeconomic History    Marital status:      Spouse name: Not on file    Number of children: Not on file    Years of education: Not on file    Highest education level: Not on file   Occupational History    Not on file Social Needs    Financial resource strain: Not on file    Food insecurity:     Worry: Not on file     Inability: Not on file    Transportation needs:     Medical: Not on file     Non-medical: Not on file   Tobacco Use    Smoking status: Never Smoker    Smokeless tobacco: Never Used   Substance and Sexual Activity    Alcohol use: Yes     Frequency: 2-4 times a month     Drinks per session: 1 or 2    Drug use: Never    Sexual activity: Not on file   Lifestyle    Physical activity:     Days per week: Not on file     Minutes per session: Not on file    Stress: Not on file   Relationships    Social connections:     Talks on phone: Not on file     Gets together: Not on file     Attends [employer] service: Not on file     Active member of club or organization: Not on file     Attends meetings of clubs or organizations: Not on file     Relationship status: Not on file    Intimate partner violence:     Fear of current or ex partner: Not on file     Emotionally abused: Not on file     Physically abused: Not on file     Forced sexual activity: Not on file   Other Topics Concern    Not on file   Social History Narrative    Not on file     Social History     Social History Narrative    Not on file       Mental status:  Appearance {SL AMB PSYCH MENTAL STATUS APPEARANCE (Optional):04873}   Mood {PSYCH MENTAL STATUS MOOD (Optional):57837}   Affect {PSYCH MENTAL STATUS AFFECT (Optional):33033}   Speech {PSYCH MENTAL STATUS SPEECH (Optional):63523}   Thought Processes {PSYCH THOUGHT PROCESSES (Optional):68118}   Hallucinations {PSYCH MENTAL STATUS HALLUCINATIONS (Optional):76240}   Thought Content {PSYCH MENTAL STATUS THOUGHT CONTENT (Optional):40179}   Abnormal Thoughts {PSYCH MENTAL STATUS ABNORMAL THOUGHTS (Optional):69359}   Orientation  {PSYCH MENTAL STATUS ORIENTATION (Optional):46431}   Remote Memory {PSYCH MENTAL STATUS RECENT AND REMOTE MEMORY (Optional):86334}   Attention Span {PSYCH MENTATL STATUS ATTENTION SPAN (Optional):26022}   Intellect {DESC; INTELLECTUAL XLTZ:31547}   Insight {PSYCH MENTAL STATUS INSIGHT (Optional):45448}   Judgement {PSYCH MENTAL STATUS JUDGEMENT (Optional):11620}   Muscle Strength {PSYCH MENTAL STATUS MUSCLE STRENGHT (Optional):78478}   Language {PSYCH MENTAL STAUS LANGUAGE (Optional):77201}   Fund of Knowledge {PSYCH MENTAL STATUS FUN OF KNOWLEDGE (Optional):13029}   Pain {PSYCH MENTAL STATUS PAIN (Optional):22887}   Pain Scale {PAIN SCALE NUMBERS:22804}         Laboratory Results: No results found for this or any previous visit  Assessment/Plan:      There are no diagnoses linked to this encounter        Treatment Recommendations- Risks Benefits         Immediate Medical/Psychiatric/Psychotherapy Treatments and Any Precautions: ***    Risks, Benefits And Possible Side Effects Of Medications:  {PSYCH RISK, BENEFITS AND POSSIBLE SIDE EFFECTS (Optional):22910}    Controlled Medication Discussion: {PSYCH CONTROLLED MED DISCUSSION (Optional):18879}
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
## 2019-11-11 ENCOUNTER — DOCUMENTATION (OUTPATIENT)
Dept: FAMILY MEDICINE CLINIC | Facility: CLINIC | Age: 74
End: 2019-11-11

## 2019-11-15 NOTE — PROGRESS NOTES
Progress Note - Behavioral Health     Sharita Booker 69 y o  male MRN: [id number]   Unit/Bed#:  Encounter: [id number]    Behavior over the last 24 hours: unchanged  Jimmie Davis continues to improve,  today  Sleep: normal  Appetite: normal  Medication side effects: N/A   ROS: no complaints    Mental Status Evaluation:    Appearance:  dressed appropriately, adequate grooming   Behavior:  normal, pleasant, cooperative, calm   Speech:  normal rate and volume   Mood:  normal   Affect:  normal range and intensity, appropriate   Thought Process:  organized, logical, coherent, goal directed   Associations: intact associations   Thought Content:  normal   Perceptual Disturbances: none   Risk Potential: Suicidal ideation - None  Homicidal ideation - None  Potential for aggression - No   Sensorium:  oriented to person, place and time/date   Memory:  recent and remote memory grossly intact   Consciousness:  alert and awake   Attention: attention span and concentration are age appropriate   Insight:  good   Judgment: good   Gait/Station: normal gait/station, normal balance   Motor Activity: no abnormal movements         Progress Toward Goals: improved    Assessment/Plan   Jimmie Davis continues to do well; he denies any depression or PTSD symptoms  Recommended Treatment: Jimmie Davis agrees to return for one more therapy sessions            Tia Luis Carlos 11/15/19
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
## 2019-11-22 NOTE — PROGRESS NOTES
Progress Note - Behavioral Health     Marylou Penny 69 y o  male MRN: [id number]   Unit/Bed#:  Encounter: [id number]              Mental Status Evaluation:    Appearance:  casually dressed, dressed appropriatelydressed appropriately;adequate grooming   Behavior:  normal   Speech:  normal rate and volume   Mood:  normal   Affect:  normal range and intensity   Thought Process:  organized, logical, coherent, goal directed   Associations: intact associations   Thought Content:  normal   Perceptual Disturbances: none   Risk Potential: Suicidal ideation - None  Homicidal ideation - None  Potential for aggression - No   Sensorium:  oriented to person, place and time/date   Memory:  recent and remote memory grossly intact   Consciousness:  alert and awake   Attention: attention span and concentration are age appropriate   Insight:  good   Judgment: good   Gait/Station: normal gait/station   Motor Activity: no abnormal movements                 Progress Toward Goals: improved, Dorothea Beck has a stable mood; continues to be active in his interests; provides care for his significant other  Recommended Treatment:  Will return for one more therapy session

## 2019-12-13 ENCOUNTER — APPOINTMENT (OUTPATIENT)
Dept: LAB | Facility: CLINIC | Age: 74
End: 2019-12-13
Payer: MEDICARE

## 2019-12-13 ENCOUNTER — TRANSCRIBE ORDERS (OUTPATIENT)
Dept: LAB | Facility: CLINIC | Age: 74
End: 2019-12-13

## 2019-12-13 DIAGNOSIS — E11.65 TYPE 2 DIABETES MELLITUS WITH HYPERGLYCEMIA, WITHOUT LONG-TERM CURRENT USE OF INSULIN (HCC): ICD-10-CM

## 2019-12-13 DIAGNOSIS — E66.09 CLASS 1 OBESITY DUE TO EXCESS CALORIES WITH SERIOUS COMORBIDITY AND BODY MASS INDEX (BMI) OF 34.0 TO 34.9 IN ADULT: ICD-10-CM

## 2019-12-13 DIAGNOSIS — N18.30 CKD (CHRONIC KIDNEY DISEASE) STAGE 3, GFR 30-59 ML/MIN (HCC): ICD-10-CM

## 2019-12-13 DIAGNOSIS — F41.9 ANXIETY AND DEPRESSION: ICD-10-CM

## 2019-12-13 DIAGNOSIS — F32.A ANXIETY AND DEPRESSION: ICD-10-CM

## 2019-12-13 DIAGNOSIS — E63.9 NUTRITION IMPAIRED DUE TO IMBALANCE OF NUTRIENTS: ICD-10-CM

## 2019-12-13 LAB
EST. AVERAGE GLUCOSE BLD GHB EST-MCNC: 166 MG/DL
HBA1C MFR BLD: 7.4 % (ref 4.2–6.3)
TSH SERPL DL<=0.05 MIU/L-ACNC: 1.33 UIU/ML (ref 0.36–3.74)

## 2019-12-13 PROCEDURE — 83036 HEMOGLOBIN GLYCOSYLATED A1C: CPT

## 2019-12-13 PROCEDURE — 36415 COLL VENOUS BLD VENIPUNCTURE: CPT

## 2019-12-13 PROCEDURE — 84443 ASSAY THYROID STIM HORMONE: CPT

## 2019-12-20 ENCOUNTER — TELEPHONE (OUTPATIENT)
Dept: FAMILY MEDICINE CLINIC | Facility: CLINIC | Age: 74
End: 2019-12-20

## 2019-12-20 ENCOUNTER — OFFICE VISIT (OUTPATIENT)
Dept: FAMILY MEDICINE CLINIC | Facility: CLINIC | Age: 74
End: 2019-12-20
Payer: MEDICARE

## 2019-12-20 VITALS
OXYGEN SATURATION: 98 % | DIASTOLIC BLOOD PRESSURE: 84 MMHG | HEART RATE: 64 BPM | TEMPERATURE: 97.6 F | SYSTOLIC BLOOD PRESSURE: 126 MMHG

## 2019-12-20 DIAGNOSIS — H61.23 CERUMEN DEBRIS ON TYMPANIC MEMBRANE OF BOTH EARS: ICD-10-CM

## 2019-12-20 DIAGNOSIS — M54.42 CHRONIC LEFT-SIDED LOW BACK PAIN WITH LEFT-SIDED SCIATICA: ICD-10-CM

## 2019-12-20 DIAGNOSIS — E63.9 NUTRITION IMPAIRED DUE TO IMBALANCE OF NUTRIENTS: ICD-10-CM

## 2019-12-20 DIAGNOSIS — E66.09 CLASS 1 OBESITY DUE TO EXCESS CALORIES WITH SERIOUS COMORBIDITY AND BODY MASS INDEX (BMI) OF 34.0 TO 34.9 IN ADULT: ICD-10-CM

## 2019-12-20 DIAGNOSIS — F41.9 ANXIETY AND DEPRESSION: ICD-10-CM

## 2019-12-20 DIAGNOSIS — N18.30 CKD (CHRONIC KIDNEY DISEASE) STAGE 3, GFR 30-59 ML/MIN (HCC): ICD-10-CM

## 2019-12-20 DIAGNOSIS — Z11.59 NEED FOR HEPATITIS C SCREENING TEST: ICD-10-CM

## 2019-12-20 DIAGNOSIS — Z87.19 HISTORY OF GASTROINTESTINAL BLEEDING: ICD-10-CM

## 2019-12-20 DIAGNOSIS — R26.89 BALANCE DISORDER: ICD-10-CM

## 2019-12-20 DIAGNOSIS — E78.2 MIXED HYPERLIPIDEMIA: ICD-10-CM

## 2019-12-20 DIAGNOSIS — F11.20 OPIOID TYPE DEPENDENCE, CONTINUOUS (HCC): ICD-10-CM

## 2019-12-20 DIAGNOSIS — E11.65 TYPE 2 DIABETES MELLITUS WITH HYPERGLYCEMIA, WITHOUT LONG-TERM CURRENT USE OF INSULIN (HCC): Primary | ICD-10-CM

## 2019-12-20 DIAGNOSIS — G89.29 CHRONIC LEFT-SIDED LOW BACK PAIN WITH LEFT-SIDED SCIATICA: ICD-10-CM

## 2019-12-20 DIAGNOSIS — F32.A ANXIETY AND DEPRESSION: ICD-10-CM

## 2019-12-20 DIAGNOSIS — Z23 ENCOUNTER FOR IMMUNIZATION: ICD-10-CM

## 2019-12-20 DIAGNOSIS — I25.2 HISTORY OF ACUTE MYOCARDIAL INFARCTION: ICD-10-CM

## 2019-12-20 DIAGNOSIS — N18.30 STAGE 3 CHRONIC KIDNEY DISEASE (HCC): ICD-10-CM

## 2019-12-20 PROBLEM — R19.5 FECAL OCCULT BLOOD TEST POSITIVE: Status: RESOLVED | Noted: 2019-09-04 | Resolved: 2019-12-20

## 2019-12-20 PROCEDURE — 90746 HEPB VACCINE 3 DOSE ADULT IM: CPT | Performed by: FAMILY MEDICINE

## 2019-12-20 PROCEDURE — G0010 ADMIN HEPATITIS B VACCINE: HCPCS | Performed by: FAMILY MEDICINE

## 2019-12-20 PROCEDURE — 90670 PCV13 VACCINE IM: CPT | Performed by: FAMILY MEDICINE

## 2019-12-20 PROCEDURE — 99214 OFFICE O/P EST MOD 30 MIN: CPT | Performed by: FAMILY MEDICINE

## 2019-12-20 PROCEDURE — G0009 ADMIN PNEUMOCOCCAL VACCINE: HCPCS | Performed by: FAMILY MEDICINE

## 2019-12-20 RX ORDER — TRAMADOL HYDROCHLORIDE 50 MG/1
TABLET ORAL
Qty: 120 TABLET | Refills: 0 | Status: SHIPPED | OUTPATIENT
Start: 2019-12-20 | End: 2020-01-09

## 2019-12-20 RX ORDER — ATORVASTATIN CALCIUM 40 MG/1
40 TABLET, FILM COATED ORAL DAILY
Qty: 90 TABLET | Refills: 3 | Status: SHIPPED | OUTPATIENT
Start: 2019-12-20

## 2019-12-20 NOTE — PROGRESS NOTES
SUBJECTIVE:    Josefina Smith is a 69 y o  male   who prevents for follow up of diabetes  Is feeling well except for sometimes off balance, chronic  No focal neuro symptoms  NO dark stool or problem in stool  He had declined a colonoscopy and instead used magnets  Declines further GI w/u  'I have swelling on the top of my feet - is that normal?'  Some L sciatica  Uses a pain cream, which seems to help  DM control: range 130 and below  No hypoglycemic symptoms  Lowest is about 48  Has  no polyuria, no polydipsia, no chest pain, nor shortness of breath  Patient Active Problem List   Diagnosis    History of acute myocardial infarction    Type 2 diabetes mellitus with hyperglycemia, without long-term current use of insulin ([facility] )    Chronic left-sided low back pain with left-sided sciatica    Class 1 obesity due to excess calories with serious comorbidity and body mass index (BMI) of 34 0 to 34 9 in adult    Hx of colonic polyps    CKD (chronic kidney disease) stage 3, GFR 30-59 ml/min ([facility])    Anxiety and depression    Nutrition impaired due to imbalance of nutrients    Fecal occult blood test positive         DM Chart review:    Pillars of Health:   Sleep is fair quality  Exercise: some walking  Diet: eating a little better       Mood: fair  Stopped seeing Dr Elsy Moreno for now  Was helpful      OBJECTIVE:   /84 (BP Location: Left arm, Patient Position: Sitting, Cuff Size: Adult)   Pulse 64   Temp 97 6 °F (36 4 °C) (Tympanic)   SpO2 98%      Appears well, in no apparent distress  Alert and oriented, pleasant and cooperative  Cardiac: Regular rate and rhythm, no murmur, rub or gallop  Lungs: clear to auscultation bilaterally  Foot exam shows no pedal edema, lesions or ulcers, or reduced pedal pulses  LS spine AROM mild limited with sl ache on far ranges   Extension is limited      ASSESSMENT/PLAN:  DM2 with following issues: needs to continue to work on healthier eating patterns and exercise  Self care Educational Topics Discussed:   Reviewed normal blood pressure levels  Discussed nutritional interventions and other lifestyle modifications to high LDL levels  Reviewed goals set from previous meeting and set new goals (see patient education)  Reviewed appropriate amount of vegetables and fruit intake per day  Reviewed healthy carbohydrates such as root vegetables and whole grains  Reviewed avoidance or at least minimization of processed carbohydrates  including commercial flour products including even commercial 'whole wheat bread'  1  Type 2 diabetes mellitus with hyperglycemia, without long-term current use of insulin ([facility] )  Healthier lifestyle  - Ambulatory referral to Ophthalmology; Future  - Comprehensive metabolic panel; Future  - sitaGLIPtin (JANUVIA) 50 mg tablet; Take 1 tablet (50 mg total) by mouth daily  Dispense: 90 tablet; Refill: 1    2  Chronic left-sided low back pain with left-sided sciatica  Profile 6  - traMADol (ULTRAM) 50 mg tablet; 4 pills at bedtime prn  Dispense: 120 tablet; Refill: 0    3  Nutrition impaired due to imbalance of nutrients  Healthier diet    4  History of acute myocardial infarction  Clinically stable    5  Class 1 obesity due to excess calories with serious comorbidity and body mass index (BMI) of 34 0 to 34 9 in adult  Healthier diet    6  Anxiety and depression  Continue with Dr Goran Galindo per need    7  Balance disorder  Ears have wax  He wants to clean them himself  - Ambulatory referral to Physical Therapy; Future    8  Cerumen debris on tympanic membrane of both ears  Ears have wax  He wants to clean them himself    9  Screen hepatitis C    - Hepatitis C antibody; Future    10  History of gastrointestinal bleeding  Clinically not active  - CBC and differential; Future    11  Stage 3 chronic kidney disease (HCC)    - UA (URINE) with reflex to Scope    12   Mixed hyperlipidemia    - atorvastatin (LIPITOR) 40 mg tablet; Take 1 tablet (40 mg total) by mouth daily  Dispense: 90 tablet; Refill: 3      BMI Counseling: There is no height or weight on file to calculate BMI  Discussed the patient's BMI with him  The BMI is above normal  Nutrition recommendations include 3-5 servings of fruits/vegetables daily  Risks and benefits of therapeutic plan discussed, answered all patient questions and concerns and patient expressed understanding and agreement of therapeutic plan      Follow up in 1 month

## 2019-12-21 LAB
6MAM UR QL: NEGATIVE NG/ML
AMPHETAMINES UR QL: NEGATIVE NG/ML
BARBITURATES UR QL: NEGATIVE NG/ML
BENZODIAZ UR QL: NEGATIVE NG/ML
BENZODIAZ UR SCN-MCNC: NORMAL NG/ML
BZE UR QL: NEGATIVE NG/ML
CREAT UR-MCNC: 103.4 MG/DL
ETHANOL UR QL: NEGATIVE NG/ML
MEDICATION PRESCRIBED: NORMAL
METHADONE UR QL: NEGATIVE NG/ML
OPIATES UR QL: NEGATIVE NG/ML
OXIDANTS UR QL: NEGATIVE MCG/ML
OXYCODONE UR QL: NEGATIVE NG/ML
PCP UR QL: NEGATIVE NG/ML
PH UR: 5.57 [PH] (ref 4.5–9)
SL AMB MEDMATCH 6 ACETYLMORPHINE: NORMAL
SL AMB MEDMATCH ALCOHOL METAB: NORMAL
SL AMB MEDMATCH AMPTHETAMINES: NORMAL
SL AMB MEDMATCH BARBITUATES: NORMAL
SL AMB MEDMATCH COCAINE METABOLITE: NORMAL
SL AMB MEDMATCH MARIJUANA METABOLITE: NORMAL
SL AMB MEDMATCH METHADONE METABOLITE: NORMAL
SL AMB MEDMATCH OPIATES: NORMAL
SL AMB MEDMATCH OXYCODONE: NORMAL
SL AMB MEDMATCH PHENCYCLIDINE: NORMAL
THC UR QL: NEGATIVE NG/ML

## 2019-12-31 ENCOUNTER — EVALUATION (OUTPATIENT)
Dept: PHYSICAL THERAPY | Facility: CLINIC | Age: 74
End: 2019-12-31
Payer: MEDICARE

## 2019-12-31 DIAGNOSIS — R26.89 BALANCE DISORDER: ICD-10-CM

## 2019-12-31 PROCEDURE — 97163 PT EVAL HIGH COMPLEX 45 MIN: CPT | Performed by: PHYSICAL THERAPIST

## 2019-12-31 NOTE — PROGRESS NOTES
PT Evaluation     Today's date: 2019  Patient name: Goldie Rice  : [id number]  MRN: [id number]  Referring provider: Ceasar Snell MD  Dx:   Encounter Diagnosis     ICD-10-CM    1  Balance disorder R26 89 Ambulatory referral to Physical Therapy                  Assessment  Assessment details: Patient presents to skilled PT for balance impairment  Patient reports near falls occurring 1-2x per month  Patient displays Abnormal muscle strength with overall grade of 3-3+/5  Patient sensation is Abnormal throughout lower extremities due to DM2  Patient balance scores are as follows: 39/56 PACHECO, 14 seconds TUG without Assistive Device, 10 Meter walk test 0 75 M/S without Assistive Device with overall results noting high risk for falls  Patient endurance scores are as follows: 17 73 seconds with 5 x sit to stand test with overall results noting decrease functional endurance  Patient subjective report notes the following 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  Patient verbalized understanding of POC              Impairments: abnormal coordination, abnormal gait, impaired balance, impaired physical strength, lacks appropriate home exercise program and safety issue  Understanding of Dx/Px/POC: good   Prognosis: good    Goals  Goals  STG 30 days    1  Patient will improve static balance with feet together Eyes Closed Firm surface  to 30 seconds indicating reduction in fall risk  2   Patient will display 2 3  second improvement with overall score of less than or equal to 12 seconds  with TUG test or lower with noted improvement being Minimal Detectable Change pre current research standards with fall risk     3  Patient will achieve 45/56 PACHECO score with minimal improve by 6 points or more demonstrating Minimal Detectable change per current research standards for this objective test which assess fall risk  4  Patient will achieve   59 ft/sec improvement with score of  3 05 ft/sec with 10 Meter Walk test, demonstrating Minimal Detectable change per current research standards for this objective test which assess fall risk  5  Patient will perform  5 x sit to stand test with overall reduction by seconds to less than 15 seconds indicating improvement with functional endurance      LT days   1  Patient will score low risk for falls with 3/4 fall risk measures   2  Patient will be able to perform floor transfer without physical assistance   3  Patient will be able to carry objects without loss of balance  4  Patient will be able to ambulate outdoors without any loss of balance  5  Patient will be able to perform duties as caregiver without any loss of balance      Cut off score   All date taken from [facility] or Rehab Measures    PACHECO test: 46/56                                              5 x STS Test:  MDC: 6 points                                                  MDC: 2 3 seconds   age norms                                                                 Age Norms   61-76 year old = M: 54, F: 55                        62-78 year old: 11 4 seconds   66-77 year old = M 47,  F: 50                       71-76 year old: 12 6 seconds    80-80 year old = M46,   F: 53                       80-80 year old: 14 8 seconds     TUG test:                                                                     10 Meter Walk Test:  MDC: 4 14 seconds       MDC:  59 ft/sec  Cut off score for Falls                                                  Age Norms  > 13 5 seconds community dwelling adults                20-29; M: 4 56 ft/sec F: 4 62 ft/sec  > 32 2 Frail Elderly                                                     30-39: M 4 76 ft/sec  F: 4 68 ft/sec          40-49: M: 4 79 ft/sec  F: 4 62 ft/sec  6 Minute Walk Test      50-59: M: 4 76 ft/sec  F: 4 56 ft/sec  MDC: 190 feet       60-69: M: 4 56 ft/sec  F: 4 26 ft/sec  Age Norms       70-+    M: 4 36 ft/sec  F: 4 16 ft/sec  60-69:    M: 1876 F: 6475  79-82:    M: 1729 F: 7134  31-68 +: M: 80 F; 1286     Plan  Patient would benefit from: skilled physical therapy  Planned modality interventions: biofeedback  Planned therapy interventions: manual therapy, motor coordination training, neuromuscular re-education, patient education, postural training, sensory integrative techniques, strengthening, stretching, therapeutic activities, therapeutic exercise, gait training, home exercise program, coordination, balance, community reintegration, flexibility, functional ROM exercises, therapeutic training and transfer training  Frequency: 2x week  Duration in weeks: 12  Plan of Care beginning date: 12/31/2019  Plan of Care expiration date: 3/24/2020  Treatment plan discussed with: patient        Subjective Evaluation    History of Present Illness  Mechanism of injury: Pt had massive heart attack when he was 51 yrs old and feels his strength has been deteriorating ever since - 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  He also had an operation on his back about 15 yrs ago- removed calcium off the inside of his spine - he walked out of there without any pain  The doctor told him he would need to have it redone again within another 7-10 yrs  He currently is experiencing 4-5 falls over the past 6 months  He gets dizzy if he spins around too quickly  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  He lives with her  There is a stair lift up to 2nd floor and grab bars t/o the home  He does assist with transfers and pushing her in the wheelchair  Quality of life: good    Pain  No pain reported  Current pain ratin  At best pain ratin  At worst pain ratin    Social Support  Steps to enter house: yes  Stairs in house: yes (stair lift )   Lives in: multiple-level home  Lives with: caretaker for a woman 2 yrs younger than him     Patient Goals  Patient goals for therapy: improved balance, independence with ADLs/IADLs, return to sport/leisure activities and increased strength  Patient goal: to stop falling, be safer @ home         Objective     Functional Assessment        Comments  Sensation: Neuropathy B/L feet secondary to DM2  Strength: Grossly 3-3+/5 throughout BLE's   Vision: Wears glasses (bifocals)   Hearing: Fluctuates in R ear, hx of gun use in the [facility]  Does not wear hearing aids       5x STS= 17 73 sec with UE usage   TUG= 14 sec  Gait speed= 0 75 m/s (2 46 ft/sec)  [place]= 39/56    VOMS (Vestibular/Ocular Motor Screen)--no symptoms unless noted      Baseline symptoms: none     Smooth pursuit Normal, with end gaze nystagmus noted     Saccades (horizontal) Normal     Saccades (vertical)  Normal     Convergence (near point)--Normal      VOR (horizontal) Saccadic tracking, but no symptoms reported      VOR (vertical) Normal     VOR cancel- Normal              Precautions:   Past Medical History:   Diagnosis Date    Fecal occult blood test positive 2019           Manual                                                                                   Exercise Diary                                                                                                                                                                                                                                                                                      Modalities

## 2020-01-07 ENCOUNTER — OFFICE VISIT (OUTPATIENT)
Dept: PHYSICAL THERAPY | Facility: CLINIC | Age: 75
End: 2020-01-07
Payer: MEDICARE

## 2020-01-07 DIAGNOSIS — R26.89 BALANCE DISORDER: Primary | ICD-10-CM

## 2020-01-07 PROCEDURE — 97112 NEUROMUSCULAR REEDUCATION: CPT | Performed by: PHYSICAL THERAPIST

## 2020-01-07 PROCEDURE — 97110 THERAPEUTIC EXERCISES: CPT | Performed by: PHYSICAL THERAPIST

## 2020-01-07 NOTE — PROGRESS NOTES
Daily Note     Today's date: 2020  Patient name: Henrique Calvert  :   MRN: [id number]  Referring provider: Kaur Rodriguez MD  Dx:   Encounter Diagnosis     ICD-10-CM    1  Balance disorder R26 89        Start Time: 0800  Stop Time: 2504  Total time in clinic (min): 55 minutes    Subjective: Pt reports he fell Friday night  He was using BSC downstairs, in the dark, he stood up and began to pull his pants up and he fell forward and hit his face on the arm of an old rocking chair  He denies pain or injury at this time, no visible bruises or injury  Objective: See treatment diary below    - Standing hip 3 way, BUE support- 20 reps, 3 sec hold  - Standing heel raises, BUE support- 20 reps, 3 sec hold  - 6" step taps, fwd and lat, BUE support weaned to 1 UE- 20 reps   - 6" step ups, fwd, BUE support- 10 reps each LE   - 6" hurdles, fwd and lat, 1 UE support - 4 cycles each  - STS, 1 airex pad - 10 reps     Assessment: Pt tolerated treatment well  Increase in LBP with R lateral step taps, otherwise no increase in LBP  Able to wean off UE support for some exercises, with close guarding from therapist  Will trial compliant surfaces next visit  Pt would benefit from continued PT to reduce fall risk and maximize safety w/ all functional mobility  Plan: Continue per plan of care  Foam pads  POC expires 3/24/20       Precautions:   Past Medical History:   Diagnosis Date    Fecal occult blood test positive 2019

## 2020-01-08 ENCOUNTER — APPOINTMENT (OUTPATIENT)
Dept: LAB | Facility: CLINIC | Age: 75
End: 2020-01-08
Payer: MEDICARE

## 2020-01-08 DIAGNOSIS — Z87.19 HISTORY OF GASTROINTESTINAL BLEEDING: ICD-10-CM

## 2020-01-08 DIAGNOSIS — E11.65 TYPE 2 DIABETES MELLITUS WITH HYPERGLYCEMIA, WITHOUT LONG-TERM CURRENT USE OF INSULIN (HCC): ICD-10-CM

## 2020-01-08 LAB
ALBUMIN SERPL BCP-MCNC: 2.9 G/DL (ref 3.5–5)
ALP SERPL-CCNC: 42 U/L (ref 46–116)
ALT SERPL W P-5'-P-CCNC: 26 U/L (ref 12–78)
ANION GAP SERPL CALCULATED.3IONS-SCNC: 2 MMOL/L (ref 4–13)
AST SERPL W P-5'-P-CCNC: 26 U/L (ref 5–45)
BACTERIA UR QL AUTO: ABNORMAL /HPF
BASOPHILS # BLD AUTO: 0.13 THOUSANDS/ΜL (ref 0–0.1)
BASOPHILS NFR BLD AUTO: 2 % (ref 0–1)
BILIRUB SERPL-MCNC: 0.51 MG/DL (ref 0.2–1)
BILIRUB UR QL STRIP: NEGATIVE
BUN SERPL-MCNC: 28 MG/DL (ref 5–25)
CALCIUM SERPL-MCNC: 8.8 MG/DL (ref 8.3–10.1)
CHLORIDE SERPL-SCNC: 111 MMOL/L (ref 100–108)
CLARITY UR: CLEAR
CO2 SERPL-SCNC: 29 MMOL/L (ref 21–32)
COLOR UR: YELLOW
CREAT SERPL-MCNC: 2.23 MG/DL (ref 0.6–1.3)
EOSINOPHIL # BLD AUTO: 0.37 THOUSAND/ΜL (ref 0–0.61)
EOSINOPHIL NFR BLD AUTO: 4 % (ref 0–6)
ERYTHROCYTE [DISTWIDTH] IN BLOOD BY AUTOMATED COUNT: 13.8 % (ref 11.6–15.1)
GFR SERPL CREATININE-BSD FRML MDRD: 28 ML/MIN/1.73SQ M
GLUCOSE P FAST SERPL-MCNC: 58 MG/DL (ref 65–99)
GLUCOSE UR STRIP-MCNC: NEGATIVE MG/DL
HCT VFR BLD AUTO: 43.9 % (ref 36.5–49.3)
HCV AB SER QL: NORMAL
HGB BLD-MCNC: 14.1 G/DL (ref 12–17)
HGB UR QL STRIP.AUTO: ABNORMAL
HYALINE CASTS #/AREA URNS LPF: ABNORMAL /LPF
IMM GRANULOCYTES # BLD AUTO: 0.04 THOUSAND/UL (ref 0–0.2)
IMM GRANULOCYTES NFR BLD AUTO: 1 % (ref 0–2)
KETONES UR STRIP-MCNC: NEGATIVE MG/DL
LEUKOCYTE ESTERASE UR QL STRIP: NEGATIVE
LYMPHOCYTES # BLD AUTO: 2.71 THOUSANDS/ΜL (ref 0.6–4.47)
LYMPHOCYTES NFR BLD AUTO: 31 % (ref 14–44)
MCH RBC QN AUTO: 29.8 PG (ref 26.8–34.3)
MCHC RBC AUTO-ENTMCNC: 32.1 G/DL (ref 31.4–37.4)
MCV RBC AUTO: 93 FL (ref 82–98)
MONOCYTES # BLD AUTO: 0.99 THOUSAND/ΜL (ref 0.17–1.22)
MONOCYTES NFR BLD AUTO: 11 % (ref 4–12)
NEUTROPHILS # BLD AUTO: 4.63 THOUSANDS/ΜL (ref 1.85–7.62)
NEUTS SEG NFR BLD AUTO: 51 % (ref 43–75)
NITRITE UR QL STRIP: NEGATIVE
NON-SQ EPI CELLS URNS QL MICRO: ABNORMAL /HPF
NRBC BLD AUTO-RTO: 0 /100 WBCS
PH UR STRIP.AUTO: 5.5 [PH]
PLATELET # BLD AUTO: 222 THOUSANDS/UL (ref 149–390)
PMV BLD AUTO: 10.3 FL (ref 8.9–12.7)
POTASSIUM SERPL-SCNC: 4.5 MMOL/L (ref 3.5–5.3)
PROT SERPL-MCNC: 6.8 G/DL (ref 6.4–8.2)
PROT UR STRIP-MCNC: ABNORMAL MG/DL
RBC # BLD AUTO: 4.73 MILLION/UL (ref 3.88–5.62)
RBC #/AREA URNS AUTO: ABNORMAL /HPF
SODIUM SERPL-SCNC: 142 MMOL/L (ref 136–145)
SP GR UR STRIP.AUTO: 1.02 (ref 1–1.03)
UROBILINOGEN UR QL STRIP.AUTO: 1 E.U./DL
WBC # BLD AUTO: 8.87 THOUSAND/UL (ref 4.31–10.16)
WBC #/AREA URNS AUTO: ABNORMAL /HPF

## 2020-01-08 PROCEDURE — 36415 COLL VENOUS BLD VENIPUNCTURE: CPT

## 2020-01-08 PROCEDURE — 81001 URINALYSIS AUTO W/SCOPE: CPT | Performed by: FAMILY MEDICINE

## 2020-01-08 PROCEDURE — 86803 HEPATITIS C AB TEST: CPT

## 2020-01-08 PROCEDURE — 85025 COMPLETE CBC W/AUTO DIFF WBC: CPT

## 2020-01-08 PROCEDURE — 80053 COMPREHEN METABOLIC PANEL: CPT

## 2020-01-09 ENCOUNTER — OFFICE VISIT (OUTPATIENT)
Dept: PHYSICAL THERAPY | Facility: CLINIC | Age: 75
End: 2020-01-09
Payer: MEDICARE

## 2020-01-09 DIAGNOSIS — M54.42 CHRONIC LEFT-SIDED LOW BACK PAIN WITH LEFT-SIDED SCIATICA: ICD-10-CM

## 2020-01-09 DIAGNOSIS — G89.29 CHRONIC LEFT-SIDED LOW BACK PAIN WITH LEFT-SIDED SCIATICA: ICD-10-CM

## 2020-01-09 DIAGNOSIS — R26.89 BALANCE DISORDER: Primary | ICD-10-CM

## 2020-01-09 PROCEDURE — 97112 NEUROMUSCULAR REEDUCATION: CPT | Performed by: PHYSICAL THERAPIST

## 2020-01-09 PROCEDURE — 97110 THERAPEUTIC EXERCISES: CPT | Performed by: PHYSICAL THERAPIST

## 2020-01-09 PROCEDURE — 97530 THERAPEUTIC ACTIVITIES: CPT | Performed by: PHYSICAL THERAPIST

## 2020-01-09 RX ORDER — TRAMADOL HYDROCHLORIDE 50 MG/1
TABLET ORAL
Qty: 120 TABLET | Refills: 0 | Status: SHIPPED | OUTPATIENT
Start: 2020-01-09 | End: 2020-01-17 | Stop reason: SDUPTHER

## 2020-01-09 NOTE — PROGRESS NOTES
Daily Note     Today's date: 2020  Patient name: Dianelys Fischer  :   MRN: [id number]  Referring provider: Ed Ramos MD  Dx:   Encounter Diagnosis     ICD-10-CM    1  Balance disorder R26 89                   Subjective: Pt reports he got on the floor yesterday to look for something underneath the bed and he had a very hard time getting back up  He also reports soreness/fatigue after last visit  Objective: See treatment diary below    - STS, 1 airex pad - 2 sets 10   - Standing hip 3 way, 1 UE support- 20 reps, 3 sec hold  - Standing heel raises, 1 UE support- 20 reps, 3 sec hold  - 8" step taps, fwd and lat, 1 UE- 20 reps each  - Floor transfers: quadruped rocking A-P, quadruped thread the needle, quadruped into tall kneel, attempted to get into 1/2 kneel but pt unable due to pain and LE weakness  NOT PERFORMED:  - 6" step ups, fwd, BUE support- 10 reps each LE   - 6" hurdles, fwd and lat, 1 UE support - 4 cycles each      Assessment: Pt tolerated treatment well  Focused on floor transfers and quadruped exercises due to pt report of difficulty getting off the floor  Unable to transition from kneeling into 1/2 kneeling due to knee pain, limited knee flexion ROM, and LE weakness  He relies on UE strength to pull upper body onto mat table and then able to twist his hips around to sit on the mat table  Transitioned to 1 UE support for all standing exercises today  Pt would benefit from continued PT to reduce fall risk and maximize safety w/ all functional mobility  Plan: Continue per plan of care  Foam pads  POC expires 3/24/20       Precautions:   Past Medical History:   Diagnosis Date    Fecal occult blood test positive 2019

## 2020-01-09 NOTE — TELEPHONE ENCOUNTER
12/11/2019  1   09/20/2019  Tramadol Hcl 50 Mg Tablet  120 00 30 Al Rem  [id number]  [facility] ([id number])  3 20 00 [place]   11/16/2019  1   09/20/2019  Tramadol Hcl 50 Mg Tablet  120 00 30 Al Rem  [id number]  [facility] ([id number])  2 20 [place]   10/19/2019  1   09/20/2019  Tramadol Hcl 50 Mg Tablet  120 00 30 Al Rem  [id number]  [facility] ([id number])  1 20 [place]   09/20/2019  1   09/20/2019  Tramadol Hcl 50 Mg Tablet  120 00 30 Al Rem  [id number]  [facility] ([id number])  0 20 00 [place]   08/16/2019  1   08/16/2019  Tramadol Hcl 50 Mg Tablet  120 00 30 Al Rem  [id number]  [facility] ([id number])  0 20 [place]   07/16/2019  1   07/16/2019  Tramadol Hcl 50 Mg Tablet  120 00 30 Al Rem  [id number]  Wal ([id number])  0 20 00 MME [address]

## 2020-01-13 ENCOUNTER — APPOINTMENT (OUTPATIENT)
Dept: PHYSICAL THERAPY | Facility: CLINIC | Age: 75
End: 2020-01-13
Payer: MEDICARE

## 2020-01-14 ENCOUNTER — OFFICE VISIT (OUTPATIENT)
Dept: PHYSICAL THERAPY | Facility: CLINIC | Age: 75
End: 2020-01-14
Payer: MEDICARE

## 2020-01-14 DIAGNOSIS — R26.89 BALANCE DISORDER: Primary | ICD-10-CM

## 2020-01-14 PROCEDURE — 97116 GAIT TRAINING THERAPY: CPT | Performed by: PHYSICAL THERAPIST

## 2020-01-14 PROCEDURE — 97112 NEUROMUSCULAR REEDUCATION: CPT | Performed by: PHYSICAL THERAPIST

## 2020-01-14 PROCEDURE — 97110 THERAPEUTIC EXERCISES: CPT | Performed by: PHYSICAL THERAPIST

## 2020-01-14 NOTE — PROGRESS NOTES
Daily Note     Today's date: 2020  Patient name: Marylou Penny  : [birth year]  MRN: [id number]  Referring provider: Cedric San MD  Dx:   Encounter Diagnosis     ICD-10-CM    1  Balance disorder R26 89                   Subjective: Pt reports no falls  Only slept 2 hrs last night but this is "normal" for him  He forgot to take his back pain medication this morning  Objective: See treatment diary below    - STS, 1 airex pad - 2 sets 10   - Standing hip 3 way, on foam,1 UE support- 20 reps, 3 sec hold  - Standing heel raises, 1 UE support- 20 reps, 3 sec hold  - 8" step taps, fwd and lat, 1 UE- 20 reps each  - 6" step ups, fwd, 1 UE support- 20 reps each LE  - 6" hurdles, fwd only today, 1 UE support - 4 cycles each      Assessment: Pt tolerated treatment well  Performs all exercises w/ 1 UE support  Trialed foam pad for hip 3 way- increased postural instability noted but pt able to maintain balance  He fatigues w/ step ups and hurdles  Pt would benefit from continued PT to reduce fall risk and maximize safety w/ all functional mobility  Plan: Continue per plan of care  Foam pads  POC expires 3/24/20       Precautions:   Past Medical History:   Diagnosis Date    Fecal occult blood test positive 2019

## 2020-01-16 ENCOUNTER — OFFICE VISIT (OUTPATIENT)
Dept: PHYSICAL THERAPY | Facility: CLINIC | Age: 75
End: 2020-01-16
Payer: MEDICARE

## 2020-01-16 DIAGNOSIS — R26.89 BALANCE DISORDER: Primary | ICD-10-CM

## 2020-01-16 PROCEDURE — 97110 THERAPEUTIC EXERCISES: CPT | Performed by: PHYSICAL THERAPIST

## 2020-01-16 PROCEDURE — 97112 NEUROMUSCULAR REEDUCATION: CPT | Performed by: PHYSICAL THERAPIST

## 2020-01-16 NOTE — PROGRESS NOTES
Daily Note     Today's date: 2020  Patient name: Sundeep Griffin  : [birth year]  MRN: [id number]  Referring provider: Lazaro Silvestre MD  Dx:   Encounter Diagnosis     ICD-10-CM    1  Balance disorder R26 89                   Subjective: Pt reports after last session he had an easier time making it out to his car, feels stamina is improving  Objective: See treatment diary below    - STS, 1 airex pad - 2 sets 10   - Standing hip 3 way, on foam,1 UE support- 20 reps, 3 sec hold  - Standing heel raises, 1 UE support- 20 reps, 3 sec hold  - 8" step taps, fwd and lat, 1 UE- 20 reps each  - 6" step ups, fwd and lat, 1 UE support- 20 reps each LE  - 6" hurdles, fwd,lat,bwd 1 UE support - 6 cycles each      Assessment: Pt tolerated treatment well  Requires rest breaks due to LE fatigue and general deconditioning, but overall improving his endurance  Improved stability on foam pad today  Added backwards hurdles today w/ good form  Pt would benefit from continued PT to reduce fall risk and maximize safety w/ all functional mobility  Plan: Continue per plan of care  Foam pads  POC expires 3/24/20       Precautions:   Past Medical History:   Diagnosis Date    Fecal occult blood test positive 2019

## 2020-01-17 ENCOUNTER — OFFICE VISIT (OUTPATIENT)
Dept: FAMILY MEDICINE CLINIC | Facility: CLINIC | Age: 75
End: 2020-01-17
Payer: MEDICARE

## 2020-01-17 VITALS
TEMPERATURE: 98.6 F | HEART RATE: 64 BPM | DIASTOLIC BLOOD PRESSURE: 88 MMHG | SYSTOLIC BLOOD PRESSURE: 142 MMHG | OXYGEN SATURATION: 98 %

## 2020-01-17 DIAGNOSIS — E66.09 CLASS 1 OBESITY DUE TO EXCESS CALORIES WITH SERIOUS COMORBIDITY AND BODY MASS INDEX (BMI) OF 34.0 TO 34.9 IN ADULT: ICD-10-CM

## 2020-01-17 DIAGNOSIS — M54.42 CHRONIC LEFT-SIDED LOW BACK PAIN WITH LEFT-SIDED SCIATICA: ICD-10-CM

## 2020-01-17 DIAGNOSIS — G89.29 CHRONIC LEFT-SIDED LOW BACK PAIN WITH LEFT-SIDED SCIATICA: ICD-10-CM

## 2020-01-17 DIAGNOSIS — F11.20 OPIOID TYPE DEPENDENCE, CONTINUOUS (HCC): ICD-10-CM

## 2020-01-17 DIAGNOSIS — E11.65 TYPE 2 DIABETES MELLITUS WITH HYPERGLYCEMIA, WITHOUT LONG-TERM CURRENT USE OF INSULIN (HCC): Primary | ICD-10-CM

## 2020-01-17 DIAGNOSIS — F41.9 ANXIETY AND DEPRESSION: ICD-10-CM

## 2020-01-17 DIAGNOSIS — F32.A MILD EPISODE OF DEPRESSION: ICD-10-CM

## 2020-01-17 DIAGNOSIS — E63.9 NUTRITION IMPAIRED DUE TO IMBALANCE OF NUTRIENTS: ICD-10-CM

## 2020-01-17 DIAGNOSIS — F32.A ANXIETY AND DEPRESSION: ICD-10-CM

## 2020-01-17 LAB
SL AMB  POCT GLUCOSE, UA: 500
SL AMB LEUKOCYTE ESTERASE,UA: ABNORMAL
SL AMB POCT BILIRUBIN,UA: ABNORMAL
SL AMB POCT BLOOD,UA: 50
SL AMB POCT CLARITY,UA: CLEAR
SL AMB POCT COLOR,UA: YELLOW
SL AMB POCT KETONES,UA: ABNORMAL
SL AMB POCT NITRITE,UA: ABNORMAL
SL AMB POCT PH,UA: 5
SL AMB POCT SPECIFIC GRAVITY,UA: 1.02
SL AMB POCT URINE PROTEIN: 250
SL AMB POCT UROBILINOGEN: ABNORMAL

## 2020-01-17 PROCEDURE — 81003 URINALYSIS AUTO W/O SCOPE: CPT | Performed by: FAMILY MEDICINE

## 2020-01-17 PROCEDURE — 99214 OFFICE O/P EST MOD 30 MIN: CPT | Performed by: FAMILY MEDICINE

## 2020-01-17 RX ORDER — GLIPIZIDE 10 MG/1
10 TABLET ORAL
Qty: 180 TABLET | Refills: 3 | Status: ON HOLD | OUTPATIENT
Start: 2020-01-17 | End: 2020-04-20 | Stop reason: SDUPTHER

## 2020-01-17 RX ORDER — TRAMADOL HYDROCHLORIDE 50 MG/1
TABLET ORAL
Qty: 120 TABLET | Refills: 0 | Status: SHIPPED | OUTPATIENT
Start: 2020-01-17 | End: 2020-06-24

## 2020-01-17 NOTE — PATIENT INSTRUCTIONS
Get rid of throw rugs or anything can slip on  Get more motion detector night lights    See if can limit deserts  If feel really want treat, eat only small proportion and spend time savoring it       Focus on 1/2 plate veggies of different colors

## 2020-01-17 NOTE — PROGRESS NOTES
SUBJECTIVE:  Chief complaint and HPI: Susie Murguia is a 69 y o  male who presents for follow up of multiple chronic medical problems, as listed below in problem list  All problems are relatively stable except for the following issues: Uses Homeopathic pain relieving foot cream, which helps  Doing PT  Feels gaining strength after 'recovering from Trulicity'  Will help with balance to reduce falls risk  Home BSs 126 this am  Often in AM is too low  Checks several times a day  Range is 30's to above 300  Uses Freestyle Josias, changes sensor every 2 weeks  Denies hypoglycemic symptoms  Had DM for about 20 years  Eats two meals a day: a 'brunch' and supper      Review of systems:  No fever/chills/sweats/unexplained weight loss  No chest pain/shortness of breath  No change in digestion or bowel movements  No change in urination  No new musculoskeletal or neurological symptoms      Chart reviewed for relevant medical, surgical and psychosocial history, medications and allergies, labs and studies  Patient Active Problem List   Diagnosis    History of acute myocardial infarction    Type 2 diabetes mellitus with hyperglycemia, without long-term current use [facility] insulin [facility]    Chronic left-sided low back pain with left-sided sciatica    Class 1 obesity due to excess calories with serious comorbidity and body mass index (BMI) of 34 0 to 34 9 in adult    Hx of colonic polyps    CKD (chronic kidney disease) stage 3, GFR 30-59 ml/min ([facility])    Anxiety and depression    Nutrition impaired due to imbalance of nutrients       BMI Counseling: There is no height or weight on file to calculate BMI  The BMI is above normal  Nutrition recommendations include encouraging healthy choices of fruits and vegetables and reducing intake of saturated and trans fat  Exercise recommendations include exercising 3-5 times per week  Patient referred to PCP due to patient being overweight         Falls Plan of Care: balance, strength, and gait training instructions were provided and referral to physical therapy  EXAM:  /88 (BP Location: Left arm, Patient Position: Sitting, Cuff Size: Adult)   Pulse 64   Temp 98 6 °F (37 °C) (Tympanic)   SpO2 98%   The patient appears well, in no apparent distress  Alert and oriented times three, pleasant and cooperative  Vital signs are as noted by the nurse  /88 (BP Location: Left arm, Patient Position: Sitting, Cuff Size: Adult)   Pulse 64   Temp 98 6 °F (37 °C) (Tympanic)   SpO2 98%     Head: normocephalic, atraumatic  Eyes: well perfused conjunctiva, not clinically pale, not jaundiced  Ears: external ear: no gross lesions  Nose: no rhinorrhea  Neck: supple, trachea in the midline and no concerning cervical lymphadenopathy  Lungs: No respiratory labor  Clear to auscultation  Heart: Regular rate and rhythm, no murmurs, gallops or rubs  Abdomen: Benign: soft, non-tender, non-distended  No guarding or rebound  No masses or organomegaly  Normal bowel sounds,   Skin: No pallor  No rashes noted  Extremities: Moves all extremities normally  3+  pedal edema (chronic)      ASSESSMENT/PLAN:  1  Opioid type dependence, continuous ([address] )  In recovery    2  Mild episode of depression (HCC)  Stable  Therapy helped handle his PTSD nightmares    3  Type 2 diabetes mellitus with hyperglycemia, without long-term current use of insulin (HCC)  Healthy lifestyle reviewed  - glipiZIDE (GLUCOTROL) 10 mg tablet; Take 1 tablet (10 mg total) by mouth 2 (two) times a day before meals With brunch and dinner  Dispense: 180 tablet; Refill: 3    4  Chronic left-sided low back pain with left-sided sciatica  Profile 6  RF Tramadol    5  Class 1 obesity due to excess calories with serious comorbidity and body mass index (BMI) of 34 0 to 34 9 in adult  Healthy lifestyle reviewed    6  Anxiety and depression  stable    7   Nutrition impaired due to imbalance of nutrients  Healthy lifestyle reviewed      Reviewed and reinforced basics of healthy lifestyle  Risks and benefits of therapeutic plan discussed, answered all patient questions and concerns and patient expressed understanding and agreement of therapeutic plan        Follow up plan: 3 months

## 2020-01-18 LAB
6MAM UR QL: NEGATIVE NG/ML
AMPHETAMINES UR QL: NEGATIVE NG/ML
BARBITURATES UR QL: NEGATIVE NG/ML
BENZODIAZ UR QL: NEGATIVE NG/ML
BENZODIAZ UR SCN-MCNC: NORMAL NG/ML
BZE UR QL: NEGATIVE NG/ML
CREAT UR-MCNC: 144.4 MG/DL
ETHANOL UR QL: NEGATIVE NG/ML
MEDICATION PRESCRIBED: NORMAL
METHADONE UR QL: NEGATIVE NG/ML
OPIATES UR QL: NEGATIVE NG/ML
OXIDANTS UR QL: NEGATIVE MCG/ML
OXYCODONE UR QL: NEGATIVE NG/ML
PCP UR QL: NEGATIVE NG/ML
PH UR: 5.21 [PH] (ref 4.5–9)
SL AMB MEDMATCH 6 ACETYLMORPHINE: NORMAL
SL AMB MEDMATCH ALCOHOL METAB: NORMAL
SL AMB MEDMATCH AMPTHETAMINES: NORMAL
SL AMB MEDMATCH BARBITUATES: NORMAL
SL AMB MEDMATCH COCAINE METABOLITE: NORMAL
SL AMB MEDMATCH MARIJUANA METABOLITE: NORMAL
SL AMB MEDMATCH METHADONE METABOLITE: NORMAL
SL AMB MEDMATCH OPIATES: NORMAL
SL AMB MEDMATCH OXYCODONE: NORMAL
SL AMB MEDMATCH PHENCYCLIDINE: NORMAL
THC UR QL: NEGATIVE NG/ML

## 2020-01-19 LAB — MICROALBUMIN UR-MCNC: 5491.7 UG/ML

## 2020-01-21 ENCOUNTER — APPOINTMENT (OUTPATIENT)
Dept: PHYSICAL THERAPY | Facility: CLINIC | Age: 75
End: 2020-01-21
Payer: MEDICARE

## 2020-01-23 ENCOUNTER — APPOINTMENT (OUTPATIENT)
Dept: PHYSICAL THERAPY | Facility: CLINIC | Age: 75
End: 2020-01-23
Payer: MEDICARE

## 2020-01-28 ENCOUNTER — APPOINTMENT (OUTPATIENT)
Dept: PHYSICAL THERAPY | Facility: CLINIC | Age: 75
End: 2020-01-28
Payer: MEDICARE

## 2020-01-30 ENCOUNTER — APPOINTMENT (OUTPATIENT)
Dept: PHYSICAL THERAPY | Facility: CLINIC | Age: 75
End: 2020-01-30
Payer: MEDICARE

## 2020-02-04 ENCOUNTER — OFFICE VISIT (OUTPATIENT)
Dept: PHYSICAL THERAPY | Facility: CLINIC | Age: 75
End: 2020-02-04
Payer: MEDICARE

## 2020-02-04 DIAGNOSIS — R26.89 BALANCE DISORDER: Primary | ICD-10-CM

## 2020-02-04 PROCEDURE — 97112 NEUROMUSCULAR REEDUCATION: CPT | Performed by: PHYSICAL THERAPIST

## 2020-02-04 NOTE — PROGRESS NOTES
PT Re-Evaluation     Today's date: 2020  Patient name: Susie Murguia  :   MRN: [id number]  Referring provider: Syeda Reeves MD  Dx:   Encounter Diagnosis     ICD-10-CM    1  Balance disorder R26 89                   Assessment  Assessment details: Patient showed improvement in PACHECO score from 39/56 to 45/56 to show a decrease in fall risk  However, patient has not consistently attended therapy due to fall on ice at home resulting in bursing  Patient reported that he fell on his R knee and has not been back to therapy since  Patient's gait speed increased from   75 m/s to  9 m/s  Patient's 5xSTS also increased from 17 73 seconds to 24 15 seconds  Patient's TUG score went from 14 seconds to 14 1 seconds, with minimal change  With consistent therapy, patient will increase his muscular strength, endurance, and decrease fall risk  And enhance functional mobility to highest level pos ible  Impairments: abnormal coordination, abnormal gait, impaired balance, impaired physical strength, lacks appropriate home exercise program and safety issue  Understanding of Dx/Px/POC: good   Prognosis: good    Goals  Goals  STG 30 days    1  Patient will improve static balance with feet together Eyes Closed Firm surface  to 30 seconds indicating reduction in fall risk  2  Patient will display 2 3  second improvement with overall score of less than or equal to 12 seconds  with TUG test or lower with noted improvement being Minimal Detectable Change pre current research standards with fall risk     3  Patient will achieve 45/56 PACHECO score with minimal improve by 6 points or more demonstrating Minimal Detectable change per current research standards for this objective test which assess fall risk  4  Patient will achieve   59 ft/sec improvement with score of  3 05 ft/sec with 10 Meter Walk test, demonstrating Minimal Detectable change per current research standards for this objective test which assess fall risk     5  Patient will perform  5 x sit to stand test with overall reduction by seconds to less than 15 seconds indicating improvement with functional endurance      LT days   1  Patient will score low risk for falls with 3/4 fall risk measures   2  Patient will be able to perform floor transfer without physical assistance   3  Patient will be able to carry objects without loss of balance  4  Patient will be able to ambulate outdoors without any loss of balance  5  Patient will be able to perform duties as caregiver without any loss of balance      Cut off score   All date taken from [facility] or Rehab Measures    PACHECO test: 46/56                                              5 x STS Test:  MDC: 6 points                                                  MDC: 2 3 seconds   age norms                                                                 Age Norms   61-76 year old = M: 54, F: 54                        61-76 year old: 11 4 seconds   66-77 year old = M 47,  F: 50                       71-76 year old: 12 6 seconds    80-80 year old = M46,   F: 53                       80-80 year old: 14 8 seconds     TUG test:                                                                     10 Meter Walk Test:  MDC: 4 14 seconds       MDC:  59 ft/sec  Cut off score for Falls                                                  Age Norms  > 13 5 seconds community dwelling adults                20-29; M: 4 56 ft/sec F: 4 62 ft/sec  > 32 2 Frail Elderly                                                     30-39: M 4 76 ft/sec  F: 4 68 ft/sec          40-49: M: 4 79 ft/sec  F: 4 62 ft/sec  6 Minute Walk Test      50-59: M: 4 76 ft/sec  F: 4 56 ft/sec  MDC: 190 feet       60-69: M: 4 56 ft/sec  F: 4 26 ft/sec  Age Norms       70-+    M: 4 36 ft/sec  F: 4 16 ft/sec  60-69:    M: 1876 F: 1765  70-79:    M: 1729 F: 1545  80-89 +: M: 7449 F; 1286     Plan  Patient would benefit from: skilled physical therapy  Planned modality interventions: biofeedback  Planned therapy interventions: manual therapy, motor coordination training, neuromuscular re-education, patient education, postural training, sensory integrative techniques, strengthening, stretching, therapeutic activities, therapeutic exercise, gait training, home exercise program, coordination, balance, community reintegration, flexibility, functional ROM exercises, therapeutic training and transfer training  Frequency: 2x week  Duration in weeks: 12  Plan of Care beginning date: 2019  Plan of Care expiration date: 3/24/2020  Treatment plan discussed with: patient        Subjective Evaluation    History of Present Illness  Mechanism of injury: Pt had massive heart attack when he was 51 yrs old and feels his strength has been deteriorating ever since - 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  He also had an operation on his back about 15 yrs ago- removed calcium off the inside of his spine - he walked out of there without any pain  The doctor told him he would need to have it redone again within another 7-10 yrs  He currently is experiencing 4-5 falls over the past 6 months  He gets dizzy if he spins around too quickly  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  He lives with her  There is a stair lift up to 2nd floor and grab bars t/o the home  He does assist with transfers and pushing her in the wheelchair     Quality of life: good    Pain  No pain reported  Current pain ratin  At best pain ratin  At worst pain ratin    Social Support  Steps to enter house: yes  Stairs in house: yes (stair lift )   Lives in: multiple-level home  Lives with: caretaker for a woman 2 yrs younger than him     Patient Goals  Patient goals for therapy: improved balance, independence with ADLs/IADLs, return to sport/leisure activities and increased strength  Patient goal: to stop falling, be safer @ home Objective     Functional Assessment        Comments  Sensation: Neuropathy B/L feet secondary to DM2  Strength: Grossly 3-4/5 throughout BLE's   Vision: Wears glasses (bifocals)   Hearing: Fluctuates in R ear, hx of gun use in the [facility]  Does not wear hearing aids        5x STS= 17 73 sec with UE usage   24 15 sec (2/4)  TUG= 14 sec  14 1 sec (2/4)  Gait speed= 0 75 m/s (2 46 ft/sec)   Gait speed=  90 m/s  [place]= 39/56  45/56 (2/4)    VOMS (Vestibular/Ocular Motor Screen)--no symptoms unless noted      Baseline symptoms: none     Smooth pursuit Normal, with end gaze nystagmus noted     Saccades (horizontal) Normal     Saccades (vertical)  Normal     Convergence (near point)--abnormal 6"      VOR (horizontal) Saccadic tracking, but no symptoms reported      VOR (vertical) Normal     VOR cancel- Normal              Precautions:   Past Medical History:   Diagnosis Date    Fecal occult blood test positive 9/4/2019

## 2020-02-06 ENCOUNTER — TELEPHONE (OUTPATIENT)
Dept: FAMILY MEDICINE CLINIC | Facility: CLINIC | Age: 75
End: 2020-02-06

## 2020-02-06 ENCOUNTER — OFFICE VISIT (OUTPATIENT)
Dept: PHYSICAL THERAPY | Facility: CLINIC | Age: 75
End: 2020-02-06
Payer: MEDICARE

## 2020-02-06 DIAGNOSIS — R26.89 BALANCE DISORDER: Primary | ICD-10-CM

## 2020-02-06 PROCEDURE — 97112 NEUROMUSCULAR REEDUCATION: CPT

## 2020-02-06 PROCEDURE — 97110 THERAPEUTIC EXERCISES: CPT

## 2020-02-06 NOTE — PROGRESS NOTES
Daily Note     Today's date: 2020  Patient name: Argelia Anne  : [birth year]  MRN: [id number]  Referring provider: Gino Sigala MD  Dx:   Encounter Diagnosis     ICD-10-CM    1  Balance disorder R26 89        Start Time: 0800  Stop Time: 0845  Total time in clinic (min): 45 minutes    Subjective: Pt reported to skilled PT today with reports of spinning dizziness, states he feels that he is slightly unsteady  He reports he has taken his BP meds  Objective: See treatment diary below    -[employer]- R- Negative x1  -[employer]- L- Negative x1    Seated BP- 170/79 mmHg at beginning of session, 186/83 at end of session      - Standing hip 3 way, on foam,1 UE support- 20 reps, 3 sec hold    - STS, 1 airex pad - 2 sets 10   - Standing heel raises, 1 UE support- 20 reps, 3 sec hold  - 8" step taps, fwd and lat, 1 UE- 20 reps each  - 6" step ups, fwd and lat, 1 UE support- 20 reps each LE  - 6" hurdles, fwd,lat 1 UE support - 6 cycles each      Assessment: Pt tolerated treatment well  Pt requires rest breaks between exercises  He reports post session he "feels little better"  Pt would benefit from continued PT to reduce fall risk and maximize safety w/ all functional mobility  Supervising PT contacted PCP regarding abnormally high BP  Plan: Continue per plan of care  Foam pads  POC expires 3/24/20       Precautions:   Past Medical History:   Diagnosis Date    Fecal occult blood test positive 2019

## 2020-02-06 NOTE — TELEPHONE ENCOUNTER
Physical therapy called stating that patient /78 concerns over high reading  Patient did take meds this am and does not have any other symptoms  Spoke with therapist told patient that we would inform you as he does have apt tomorrow with you  Patient instructed to go home and rest  If you fee he needs to be seen prior to tomorrow's apt we would call him  [facility] if you want him seen

## 2020-02-07 ENCOUNTER — OFFICE VISIT (OUTPATIENT)
Dept: FAMILY MEDICINE CLINIC | Facility: CLINIC | Age: 75
End: 2020-02-07
Payer: MEDICARE

## 2020-02-07 VITALS
OXYGEN SATURATION: 97 % | HEART RATE: 64 BPM | BODY MASS INDEX: 34.72 KG/M2 | WEIGHT: 216 LBS | HEIGHT: 66 IN | RESPIRATION RATE: 16 BRPM | SYSTOLIC BLOOD PRESSURE: 142 MMHG | DIASTOLIC BLOOD PRESSURE: 64 MMHG

## 2020-02-07 DIAGNOSIS — E66.09 CLASS 1 OBESITY DUE TO EXCESS CALORIES WITH SERIOUS COMORBIDITY AND BODY MASS INDEX (BMI) OF 34.0 TO 34.9 IN ADULT: ICD-10-CM

## 2020-02-07 DIAGNOSIS — F41.9 ANXIETY AND DEPRESSION: ICD-10-CM

## 2020-02-07 DIAGNOSIS — I10 ESSENTIAL HYPERTENSION: ICD-10-CM

## 2020-02-07 DIAGNOSIS — F32.A ANXIETY AND DEPRESSION: ICD-10-CM

## 2020-02-07 DIAGNOSIS — E63.9 NUTRITION IMPAIRED DUE TO IMBALANCE OF NUTRIENTS: ICD-10-CM

## 2020-02-07 DIAGNOSIS — E11.65 TYPE 2 DIABETES MELLITUS WITH HYPERGLYCEMIA, WITHOUT LONG-TERM CURRENT USE OF INSULIN (HCC): Primary | ICD-10-CM

## 2020-02-07 DIAGNOSIS — N18.30 CKD (CHRONIC KIDNEY DISEASE) STAGE 3, GFR 30-59 ML/MIN (HCC): ICD-10-CM

## 2020-02-07 PROCEDURE — 3066F NEPHROPATHY DOC TX: CPT | Performed by: FAMILY MEDICINE

## 2020-02-07 PROCEDURE — 1160F RVW MEDS BY RX/DR IN RCRD: CPT | Performed by: FAMILY MEDICINE

## 2020-02-07 PROCEDURE — 99214 OFFICE O/P EST MOD 30 MIN: CPT | Performed by: FAMILY MEDICINE

## 2020-02-07 PROCEDURE — 3078F DIAST BP <80 MM HG: CPT | Performed by: FAMILY MEDICINE

## 2020-02-07 PROCEDURE — 1036F TOBACCO NON-USER: CPT | Performed by: FAMILY MEDICINE

## 2020-02-07 PROCEDURE — 3008F BODY MASS INDEX DOCD: CPT | Performed by: FAMILY MEDICINE

## 2020-02-07 PROCEDURE — 3077F SYST BP >= 140 MM HG: CPT | Performed by: FAMILY MEDICINE

## 2020-02-07 PROCEDURE — 4040F PNEUMOC VAC/ADMIN/RCVD: CPT | Performed by: FAMILY MEDICINE

## 2020-02-07 PROCEDURE — 4010F ACE/ARB THERAPY RXD/TAKEN: CPT | Performed by: FAMILY MEDICINE

## 2020-02-07 RX ORDER — CHLORTHALIDONE 25 MG/1
25 TABLET ORAL DAILY
Qty: 90 TABLET | Refills: 3 | Status: SHIPPED | OUTPATIENT
Start: 2020-02-07 | End: 2020-04-20 | Stop reason: HOSPADM

## 2020-02-07 RX ORDER — RAMIPRIL 5 MG/1
5 CAPSULE ORAL DAILY
Qty: 90 CAPSULE | Refills: 3 | Status: SHIPPED | OUTPATIENT
Start: 2020-02-07 | End: 2020-04-20 | Stop reason: HOSPADM

## 2020-02-07 NOTE — PROGRESS NOTES
SUBJECTIVE:  Chief complaint and HPI: Misty Tolbert is a 69 y o  male who presents for follow up of multiple chronic medical problems, as listed below in problem list  All problems are relatively stable except for the following issues: BP was high at rehab  Had some dizziness during his PT  Now is resolved  Blood sugar was ok (98)  No CP/SOB/Vertigo/nausea  No symptomatic hypoglycemia  Eating regular meals      Review of systems:  No fever/chills/sweats/unexplained weight loss  No chest pain/shortness of breath  No change in digestion or bowel movements  No change in urination  No new musculoskeletal or neurological symptoms      Chart reviewed for relevant medical, surgical and psychosocial history, medications and allergies, labs and studies  Patient Active Problem List   Diagnosis    History of acute myocardial infarction    Type 2 diabetes mellitus with hyperglycemia, without long-term current use of insulin ([facility] [address] )    Chronic left-sided low back pain with left-sided sciatica    Class 1 obesity due to excess calories with serious comorbidity and body mass index (BMI) of 34 0 to 34 9 in adult    Hx of colonic polyps    CKD (chronic kidney disease) stage 3, GFR 30-59 ml/min ([facility])    Anxiety and depression    Nutrition impaired due to imbalance of nutrients    Opioid type dependence, continuous (HCC)    Mild episode of depression ([facility] [address] )             EXAM:  /64   Pulse 64   Resp 16   Ht 5' 6" (1 676 m)   Wt 98 kg (216 lb)   SpO2 97%   BMI 34 86 kg/m²   The patient appears well, in no apparent distress  Alert and oriented times three, pleasant and cooperative  Vital signs are as noted by the nurse   /64   Pulse 64   Resp 16   Ht 5' 6" (1 676 m)   Wt 98 kg (216 lb)   SpO2 97%   BMI 34 86 kg/m²     Head: normocephalic, atraumatic  Eyes: well perfused conjunctiva, not clinically pale, not jaundiced  Ears: external ear: no gross lesions  Nose: no rhinorrhea  Neck: supple, trachea in the midline and no concerning cervical lymphadenopathy  Lungs: No respiratory labor  Clear to auscultation  Heart: Regular rate and rhythm, no murmurs, gallops or rubs  Abdomen: Benign: soft, non-tender, non-distended  No guarding or rebound  No masses or organomegaly  Normal bowel sounds,   Skin: No pallor  No rashes noted  Extremities: Moves all extremities normally  3+ pedal edema (chronic)      ASSESSMENT/PLAN:  1  Type 2 diabetes mellitus with hyperglycemia, without long-term current use of insulin ([facility])  Overall well controlled  Home BS range: 50 - 200      2  CKD (chronic kidney disease) stage 3, GFR 30-59 ml/min ([facility])  Monitor  Has microalbunemia    3  Nutrition impaired due to imbalance of nutrients  Encouraged more veggies    4  Class 1 obesity due to excess calories with serious comorbidity and body mass index (BMI) of 34 0 to 34 9 in adult  Encouraged more veggies    5  Anxiety and depression  Mood is better    6  Essential hypertension    - ramipril (ALTACE) 5 mg capsule; Take 1 capsule (5 mg total) by mouth daily  Dispense: 90 capsule; Refill: 3  - chlorthalidone 25 mg tablet; Take 1 tablet (25 mg total) by mouth daily  Dispense: 90 tablet; Refill: 3    Is continuing PT for a R knee issue      Reviewed and reinforced basics of healthy lifestyle  Risks and benefits of therapeutic plan discussed, answered all patient questions and concerns and patient expressed understanding and agreement of therapeutic plan        Follow up plan: 3 months

## 2020-02-07 NOTE — PATIENT INSTRUCTIONS
Stop Amlodipine  Start Chlorthalidone (a 'water pilll' that makes one pee)  Ramipril (lowers BP and helps protect kidney)

## 2020-03-07 DIAGNOSIS — E11.65 TYPE 2 DIABETES MELLITUS WITH HYPERGLYCEMIA, WITHOUT LONG-TERM CURRENT USE OF INSULIN (HCC): ICD-10-CM

## 2020-03-09 RX ORDER — SITAGLIPTIN 50 MG/1
TABLET, FILM COATED ORAL
Qty: 90 TABLET | Refills: 1 | Status: SHIPPED | OUTPATIENT
Start: 2020-03-09

## 2020-03-19 ENCOUNTER — HOSPITAL ENCOUNTER (INPATIENT)
Facility: HOSPITAL | Age: 75
LOS: 1 days | Discharge: LEFT BEFORE DISCHARGE | DRG: 281 | End: 2020-03-19
Attending: EMERGENCY MEDICINE | Admitting: FAMILY MEDICINE
Payer: MEDICARE

## 2020-03-19 ENCOUNTER — APPOINTMENT (OUTPATIENT)
Dept: RADIOLOGY | Facility: CLINIC | Age: 75
DRG: 281 | End: 2020-03-19
Payer: MEDICARE

## 2020-03-19 ENCOUNTER — OFFICE VISIT (OUTPATIENT)
Dept: URGENT CARE | Facility: CLINIC | Age: 75
End: 2020-03-19
Payer: MEDICARE

## 2020-03-19 ENCOUNTER — TELEPHONE (OUTPATIENT)
Dept: FAMILY MEDICINE CLINIC | Facility: CLINIC | Age: 75
End: 2020-03-19

## 2020-03-19 VITALS
TEMPERATURE: 98 F | HEART RATE: 94 BPM | DIASTOLIC BLOOD PRESSURE: 77 MMHG | SYSTOLIC BLOOD PRESSURE: 149 MMHG | OXYGEN SATURATION: 98 % | RESPIRATION RATE: 21 BRPM

## 2020-03-19 VITALS
HEART RATE: 110 BPM | SYSTOLIC BLOOD PRESSURE: 136 MMHG | TEMPERATURE: 99.5 F | OXYGEN SATURATION: 99 % | RESPIRATION RATE: 20 BRPM | DIASTOLIC BLOOD PRESSURE: 80 MMHG

## 2020-03-19 DIAGNOSIS — R05.9 COUGH: ICD-10-CM

## 2020-03-19 DIAGNOSIS — N18.9 ACUTE KIDNEY INJURY SUPERIMPOSED ON CHRONIC KIDNEY DISEASE (HCC): ICD-10-CM

## 2020-03-19 DIAGNOSIS — Z20.828 EXPOSURE TO SARS-ASSOCIATED CORONAVIRUS: Primary | ICD-10-CM

## 2020-03-19 DIAGNOSIS — R06.02 SHORTNESS OF BREATH: ICD-10-CM

## 2020-03-19 DIAGNOSIS — R19.7 DIARRHEA, UNSPECIFIED TYPE: ICD-10-CM

## 2020-03-19 DIAGNOSIS — N17.9 ACUTE KIDNEY INJURY SUPERIMPOSED ON CHRONIC KIDNEY DISEASE (HCC): ICD-10-CM

## 2020-03-19 DIAGNOSIS — Z20.828 EXPOSURE TO SARS-ASSOCIATED CORONAVIRUS: ICD-10-CM

## 2020-03-19 DIAGNOSIS — I21.4 NSTEMI (NON-ST ELEVATED MYOCARDIAL INFARCTION) (HCC): ICD-10-CM

## 2020-03-19 DIAGNOSIS — I50.9 NEW ONSET OF CONGESTIVE HEART FAILURE (HCC): Primary | ICD-10-CM

## 2020-03-19 DIAGNOSIS — R50.9 FEVER, UNSPECIFIED: Primary | ICD-10-CM

## 2020-03-19 PROBLEM — I10 HTN (HYPERTENSION): Status: ACTIVE | Noted: 2020-03-19

## 2020-03-19 PROBLEM — E78.5 HYPERLIPIDEMIA: Status: ACTIVE | Noted: 2020-03-19

## 2020-03-19 PROBLEM — E03.9 HYPOTHYROIDISM: Status: ACTIVE | Noted: 2020-03-19

## 2020-03-19 LAB
ALBUMIN SERPL BCP-MCNC: 2.9 G/DL (ref 3.5–5)
ALP SERPL-CCNC: 60 U/L (ref 46–116)
ALT SERPL W P-5'-P-CCNC: 29 U/L (ref 12–78)
ANION GAP SERPL CALCULATED.3IONS-SCNC: 8 MMOL/L (ref 4–13)
AST SERPL W P-5'-P-CCNC: 29 U/L (ref 5–45)
BASOPHILS # BLD AUTO: 0.07 THOUSANDS/ΜL (ref 0–0.1)
BASOPHILS NFR BLD AUTO: 1 % (ref 0–1)
BILIRUB SERPL-MCNC: 0.6 MG/DL (ref 0.2–1)
BUN SERPL-MCNC: 40 MG/DL (ref 5–25)
CALCIUM SERPL-MCNC: 8.8 MG/DL (ref 8.3–10.1)
CHLORIDE SERPL-SCNC: 106 MMOL/L (ref 100–108)
CO2 SERPL-SCNC: 28 MMOL/L (ref 21–32)
CREAT SERPL-MCNC: 2.61 MG/DL (ref 0.6–1.3)
EOSINOPHIL # BLD AUTO: 0.26 THOUSAND/ΜL (ref 0–0.61)
EOSINOPHIL NFR BLD AUTO: 4 % (ref 0–6)
ERYTHROCYTE [DISTWIDTH] IN BLOOD BY AUTOMATED COUNT: 13.8 % (ref 11.6–15.1)
GFR SERPL CREATININE-BSD FRML MDRD: 23 ML/MIN/1.73SQ M
GLUCOSE SERPL-MCNC: 258 MG/DL (ref 65–140)
HCT VFR BLD AUTO: 39.2 % (ref 36.5–49.3)
HGB BLD-MCNC: 12.5 G/DL (ref 12–17)
IMM GRANULOCYTES # BLD AUTO: 0.03 THOUSAND/UL (ref 0–0.2)
IMM GRANULOCYTES NFR BLD AUTO: 0 % (ref 0–2)
LYMPHOCYTES # BLD AUTO: 1.75 THOUSANDS/ΜL (ref 0.6–4.47)
LYMPHOCYTES NFR BLD AUTO: 24 % (ref 14–44)
MAGNESIUM SERPL-MCNC: 2 MG/DL (ref 1.6–2.6)
MCH RBC QN AUTO: 30.4 PG (ref 26.8–34.3)
MCHC RBC AUTO-ENTMCNC: 31.9 G/DL (ref 31.4–37.4)
MCV RBC AUTO: 95 FL (ref 82–98)
MONOCYTES # BLD AUTO: 0.81 THOUSAND/ΜL (ref 0.17–1.22)
MONOCYTES NFR BLD AUTO: 11 % (ref 4–12)
NEUTROPHILS # BLD AUTO: 4.51 THOUSANDS/ΜL (ref 1.85–7.62)
NEUTS SEG NFR BLD AUTO: 60 % (ref 43–75)
NRBC BLD AUTO-RTO: 0 /100 WBCS
NT-PROBNP SERPL-MCNC: ABNORMAL PG/ML
PLATELET # BLD AUTO: 290 THOUSANDS/UL (ref 149–390)
PMV BLD AUTO: 10.4 FL (ref 8.9–12.7)
POTASSIUM SERPL-SCNC: 3.6 MMOL/L (ref 3.5–5.3)
PROT SERPL-MCNC: 6.6 G/DL (ref 6.4–8.2)
RBC # BLD AUTO: 4.11 MILLION/UL (ref 3.88–5.62)
SODIUM SERPL-SCNC: 142 MMOL/L (ref 136–145)
TROPONIN I SERPL-MCNC: 0.91 NG/ML
WBC # BLD AUTO: 7.43 THOUSAND/UL (ref 4.31–10.16)

## 2020-03-19 PROCEDURE — 85025 COMPLETE CBC W/AUTO DIFF WBC: CPT | Performed by: EMERGENCY MEDICINE

## 2020-03-19 PROCEDURE — 1160F RVW MEDS BY RX/DR IN RCRD: CPT | Performed by: PHYSICIAN ASSISTANT

## 2020-03-19 PROCEDURE — 4040F PNEUMOC VAC/ADMIN/RCVD: CPT | Performed by: PHYSICIAN ASSISTANT

## 2020-03-19 PROCEDURE — 36415 COLL VENOUS BLD VENIPUNCTURE: CPT | Performed by: EMERGENCY MEDICINE

## 2020-03-19 PROCEDURE — NC001 PR NO CHARGE: Performed by: FAMILY MEDICINE

## 2020-03-19 PROCEDURE — 83880 ASSAY OF NATRIURETIC PEPTIDE: CPT | Performed by: EMERGENCY MEDICINE

## 2020-03-19 PROCEDURE — 83735 ASSAY OF MAGNESIUM: CPT | Performed by: EMERGENCY MEDICINE

## 2020-03-19 PROCEDURE — 71046 X-RAY EXAM CHEST 2 VIEWS: CPT

## 2020-03-19 PROCEDURE — 3078F DIAST BP <80 MM HG: CPT | Performed by: PHYSICIAN ASSISTANT

## 2020-03-19 PROCEDURE — 96374 THER/PROPH/DIAG INJ IV PUSH: CPT

## 2020-03-19 PROCEDURE — 3077F SYST BP >= 140 MM HG: CPT | Performed by: PHYSICIAN ASSISTANT

## 2020-03-19 PROCEDURE — 99213 OFFICE O/P EST LOW 20 MIN: CPT | Performed by: PHYSICIAN ASSISTANT

## 2020-03-19 PROCEDURE — 3066F NEPHROPATHY DOC TX: CPT | Performed by: PHYSICIAN ASSISTANT

## 2020-03-19 PROCEDURE — 84484 ASSAY OF TROPONIN QUANT: CPT | Performed by: EMERGENCY MEDICINE

## 2020-03-19 PROCEDURE — 87631 RESP VIRUS 3-5 TARGETS: CPT | Performed by: PHYSICIAN ASSISTANT

## 2020-03-19 PROCEDURE — 99284 EMERGENCY DEPT VISIT MOD MDM: CPT

## 2020-03-19 PROCEDURE — 87635 SARS-COV-2 COVID-19 AMP PRB: CPT | Performed by: PHYSICIAN ASSISTANT

## 2020-03-19 PROCEDURE — 80053 COMPREHEN METABOLIC PANEL: CPT | Performed by: EMERGENCY MEDICINE

## 2020-03-19 PROCEDURE — 93005 ELECTROCARDIOGRAM TRACING: CPT

## 2020-03-19 PROCEDURE — 1036F TOBACCO NON-USER: CPT | Performed by: PHYSICIAN ASSISTANT

## 2020-03-19 PROCEDURE — 99285 EMERGENCY DEPT VISIT HI MDM: CPT | Performed by: EMERGENCY MEDICINE

## 2020-03-19 RX ORDER — ASPIRIN 81 MG/1
324 TABLET, CHEWABLE ORAL ONCE
Status: COMPLETED | OUTPATIENT
Start: 2020-03-19 | End: 2020-03-19

## 2020-03-19 RX ORDER — ASPIRIN 81 MG/1
TABLET, CHEWABLE ORAL
Status: COMPLETED
Start: 2020-03-19 | End: 2020-03-19

## 2020-03-19 RX ORDER — NITROGLYCERIN 0.4 MG/1
0.4 TABLET SUBLINGUAL ONCE
Status: COMPLETED | OUTPATIENT
Start: 2020-03-19 | End: 2020-03-19

## 2020-03-19 RX ORDER — FUROSEMIDE 10 MG/ML
20 INJECTION INTRAMUSCULAR; INTRAVENOUS ONCE
Status: COMPLETED | OUTPATIENT
Start: 2020-03-19 | End: 2020-03-19

## 2020-03-19 RX ORDER — BENZONATATE 100 MG/1
100 CAPSULE ORAL 3 TIMES DAILY PRN
Qty: 20 CAPSULE | Refills: 0 | Status: SHIPPED | OUTPATIENT
Start: 2020-03-19 | End: 2020-04-27 | Stop reason: ALTCHOICE

## 2020-03-19 RX ADMIN — ASPIRIN 81 MG 324 MG: 81 TABLET ORAL at 19:23

## 2020-03-19 RX ADMIN — NITROGLYCERIN 0.4 MG: 0.4 TABLET SUBLINGUAL at 19:39

## 2020-03-19 RX ADMIN — ASPIRIN 324 MG: 81 TABLET, CHEWABLE ORAL at 19:23

## 2020-03-19 RX ADMIN — FUROSEMIDE 20 MG: 10 INJECTION, SOLUTION INTRAMUSCULAR; INTRAVENOUS at 19:37

## 2020-03-19 RX ADMIN — NITROGLYCERIN 1 INCH: 20 OINTMENT TOPICAL at 19:35

## 2020-03-19 NOTE — ED PROVIDER NOTES
History  Chief Complaint   Patient presents with    Diarrhea     patient from urgent care, qwhere he was tested for [address]  Sent for diarrhea and 13 pound weightloss over the last two weeks  Patient in ER sent from urgent care for evaluation of diarrhea - which he describes as nonbloody, watery stools as many as 5 times daily  Patient also states that he has had flu-like symptoms for the past 2 weeks including cough, fever T-max 104°, and increasing dyspnea on exertion  Patient also complains of orthopnea  He was evaluated at the urgent care and had a chest x-ray, flu swab, and COVID 19 tests ordered  Patient's chest x-ray was negative for acute pathology and he was advised to follow up in the ER for further evaluation  Patient denies chest pain at the time of initial evaluation  He states that he has a history of bilateral lower extremity edema, but symptoms have improved in the past 2 weeks  Patient states that he has also been taking Advil cold and Sinus every 6 hours for the past 2 weeks, last dose taken at 2:00 p m  Rolando Bullock Patient has no conversational dyspnea at the time of initial evaluation  History provided by:  Patient   used: No    Diarrhea   Quality:  Watery  Severity:  Moderate  Onset quality:  Gradual  Timing:  Constant  Progression:  Worsening  Associated symptoms: fever and myalgias    Associated symptoms: no abdominal pain, no chills and no vomiting        Prior to Admission Medications   Prescriptions Last Dose Informant Patient Reported? Taking?    Continuous Blood Gluc  (FREESTYLE JUVENTINO 14 DAY READER) EMMA  Self Yes No   Sig: U UTD   Continuous Blood Gluc Sensor (FREESTYLE JUVENTINO 14 DAY SENSOR) MISC   No No   Sig: Change every 14 days   JANUVIA 50 MG tablet   No No   Sig: TAKE 1 TABLET(50 MG) BY MOUTH DAILY   aspirin 325 mg tablet  Self Yes No   Sig: Take 325 mg by mouth daily   atorvastatin (LIPITOR) 40 mg tablet   No No   Sig: Take 1 tablet (40 mg total) by mouth daily   benzonatate (TESSALON PERLES) 100 mg capsule   No No   Sig: Take 1 capsule (100 mg total) by mouth 3 (three) times a day as needed for cough   chlorthalidone 25 mg tablet   No No   Sig: Take 1 tablet (25 mg total) by mouth daily   cholecalciferol (VITAMIN D3) 1,000 units tablet  Self Yes No   Sig: Take 2,000 Units by mouth daily   clotrimazole-betamethasone (LOTRISONE) 1-0 05 % cream  Self No No   Sig: Apply to groin rash daily prn   fenofibrate (TRIGLIDE) 160 MG tablet   Yes No   Sig: Take 160 mg by mouth daily   glipiZIDE (GLUCOTROL) 10 mg tablet   No No   Sig: Take 1 tablet (10 mg total) by mouth 2 (two) times a day before meals With brunch and dinner   levothyroxine 150 mcg tablet  Self Yes No   Sig: TK 1 T PO ONCE A DAY   metoprolol tartrate (LOPRESSOR) 100 mg tablet  Self No No   Sig: TAKE 1 AND 1/2 TABLETS BY MOUTH TWICE DAILY AS DIRECTED   ramipril (ALTACE) 5 mg capsule   No No   Sig: Take 1 capsule (5 mg total) by mouth daily   traMADol (ULTRAM) 50 mg tablet   No No   Sig: TAKE 4 TABLETS BY MOUTH AT BEDTIME AS NEEDED      Facility-Administered Medications: None       Past Medical History:   Diagnosis Date    [facility] mellitus ([facility] [place] [facility]     Fecal occult blood test positive 9/4/2019       Past Surgical History:   Procedure Laterality Date    ROTATOR CUFF REPAIR         History reviewed  No pertinent family history  I have reviewed and agree with the history as documented  E-Cigarette/Vaping    E-Cigarette Use Never User      E-Cigarette/Vaping Substances    Nicotine No     THC No     CBD No     Flavoring No     Other No     Unknown No      Social History     Tobacco Use    Smoking status: Never Smoker    Smokeless tobacco: Never Used   Substance Use Topics    Alcohol use: Yes     Frequency: 2-4 times a month     Drinks per session: 1 or 2    Drug use: Never       Review of Systems   Constitutional: Positive for fever  Negative for chills     Respiratory: Positive for cough and shortness of breath  Negative for wheezing  Cardiovascular: Positive for leg swelling  Negative for chest pain and palpitations  Gastrointestinal: Positive for diarrhea  Negative for abdominal pain, constipation, nausea and vomiting  Genitourinary: Negative for dysuria, flank pain, hematuria and urgency  Musculoskeletal: Positive for myalgias  Negative for back pain  Skin: Negative for color change and rash  All other systems reviewed and are negative  Physical Exam  Physical Exam   Constitutional: He is oriented to person, place, and time  He appears well-developed and well-nourished  HENT:   Head: Normocephalic and atraumatic  Eyes: Pupils are equal, round, and reactive to light  EOM are normal    Cardiovascular: Normal rate, regular rhythm and normal heart sounds  Pulmonary/Chest: Effort normal and breath sounds normal  No respiratory distress  He has no wheezes  He has no rales  He exhibits no tenderness  Abdominal: Soft  Bowel sounds are normal  He exhibits no distension and no mass  There is no tenderness  There is no rebound and no guarding  Musculoskeletal: He exhibits edema  He exhibits no tenderness or deformity  Neurological: He is alert and oriented to person, place, and time  Skin: Skin is warm and dry  Psychiatric: He has a normal mood and affect  His behavior is normal  Judgment and thought content normal    Nursing note and vitals reviewed        Vital Signs  ED Triage Vitals   Temperature Pulse Respirations Blood Pressure SpO2   03/19/20 1738 03/19/20 1738 03/19/20 1738 03/19/20 1736 03/19/20 1738   98 °F (36 7 °C) (!) 112 20 (!) 175/98 100 %      Temp Source Heart Rate Source Patient Position - Orthostatic VS BP Location FiO2 (%)   03/19/20 1738 03/19/20 1738 03/19/20 1923 03/19/20 1736 --   Tympanic Monitor Lying Right arm       Pain Score       03/19/20 1953       1           Vitals:    03/19/20 2000 03/19/20 2015 03/19/20 2045 03/19/20 2100   BP: 158/83 157/75 150/79 149/77   Pulse: 98 92 92 94   Patient Position - Orthostatic VS:             Visual Acuity      ED Medications  Medications   furosemide (LASIX) injection 20 mg (20 mg Intravenous Given 3/19/20 1937)   nitroglycerin (NITROSTAT) SL tablet 0 4 mg (0 4 mg Sublingual Given 3/19/20 1939)   nitroglycerin (NITRO-BID) 2 % TD ointment 1 inch (1 inch Topical Given 3/19/20 1935)   aspirin chewable tablet 324 mg (324 mg Oral Given 3/19/20 1923)       Diagnostic Studies  Results Reviewed     Procedure Component Value Units Date/Time    NT-BNP PRO [[id number]]  (Abnormal) Collected:  03/19/20 1812    Lab Status:  Final result Specimen:  Blood from Arm, Left Updated:  03/19/20 1913     NT-proBNP 34,042 pg/mL     Magnesium [[id number]]  (Normal) Collected:  03/19/20 1812    Lab Status:  Final result Specimen:  Blood from Arm, Left Updated:  03/19/20 1913     Magnesium 2 0 mg/dL     Troponin I [[id number]]  (Abnormal) Collected:  03/19/20 1812    Lab Status:  Final result Specimen:  Blood from Arm, Left Updated:  03/19/20 1850     Troponin I 0 91 ng/mL     Comprehensive metabolic panel [[id number]]  (Abnormal) Collected:  03/19/20 1812    Lab Status:  Final result Specimen:  Blood from Arm, Left Updated:  03/19/20 1836     Sodium 142 mmol/L      Potassium 3 6 mmol/L      Chloride 106 mmol/L      CO2 28 mmol/L      ANION GAP 8 mmol/L      BUN 40 mg/dL      Creatinine 2 61 mg/dL      Glucose 258 mg/dL      Calcium 8 8 mg/dL      AST 29 U/L      ALT 29 U/L      Alkaline Phosphatase 60 U/L      Total Protein 6 6 g/dL      Albumin 2 9 g/dL      Total Bilirubin 0 60 mg/dL      eGFR 23 ml/min/1 73sq m     Narrative:       [place] guidelines for Chronic Kidney Disease (CKD):     Stage 1 with normal or high GFR (GFR > 90 mL/min/1 73 square meters)    Stage 2 Mild CKD (GFR = 60-89 mL/min/1 73 square meters)    Stage 3A Moderate CKD (GFR = 45-59 mL/min/1 73 square meters)    Stage 3B Moderate CKD (GFR = 30-44 mL/min/1 73 square meters)    Stage 4 Severe CKD (GFR = 15-29 mL/min/1 73 square meters)    Stage 5 End Stage CKD (GFR <15 mL/min/1 73 square meters)  Note: GFR calculation is accurate only with a steady state creatinine    CBC and differential [[id number]] Collected:  03/19/20 1812    Lab Status:  Final result Specimen:  Blood from Arm, Left Updated:  03/19/20 1820     WBC 7 43 Thousand/uL      RBC 4 11 Million/uL      Hemoglobin 12 5 g/dL      Hematocrit 39 2 %      MCV 95 fL      MCH 30 4 pg      MCHC 31 9 g/dL      RDW 13 8 %      MPV 10 4 fL      Platelets 170 Thousands/uL      nRBC 0 /100 WBCs      Neutrophils Relative 60 %      Immat GRANS % 0 %      Lymphocytes Relative 24 %      Monocytes Relative 11 %      Eosinophils Relative 4 %      Basophils Relative 1 %      Neutrophils Absolute 4 51 Thousands/µL      Immature Grans Absolute 0 03 Thousand/uL      Lymphocytes Absolute 1 75 Thousands/µL      Monocytes Absolute 0 81 Thousand/µL      Eosinophils Absolute 0 26 Thousand/µL      Basophils Absolute 0 07 Thousands/µL                  No orders to display              Procedures  ECG 12 Lead Documentation Only  Date/Time: 3/19/2020 7:21 PM  Performed by: Fide Hernandez DO  Authorized by: Fide Hernandez DO     Indications / Diagnosis:  Sob  ECG reviewed by me, the ED Provider: yes    Patient location:  ED  Previous ECG:     Previous ECG:  Unavailable    Comparison to cardiac monitor: Yes    Interpretation:     Interpretation: abnormal    Rate:     ECG rate:  97bpm    ECG rate assessment: normal    Rhythm:     Rhythm: sinus rhythm    Ectopy:     Ectopy: none    QRS:     QRS axis:  Normal  T waves:     T waves: inverted      Inverted:  V4, V5 and V6             ED Course                                 MDM  Number of Diagnoses or Management Options  Acute kidney injury superimposed on chronic kidney disease [facility]):   New onset of congestive heart failure [facility]):   NSTEMI (non-ST elevated myocardial infarction) [facility]):   Diagnosis management comments: Patient with complain of diarrhea, dyspnea on exertion and orthopnea  Labs and chest x-ray reviewed  Patient with new onset congestive heart failure and NSTEMI  He denies chest pain  He states that he has "carotid pain", for which she has been prescribed sublingual nitroglycerin  Patient requests sublingual nitroglycerin at this time will give him 1 dose  Patient will be admitted to [facility]    On the room patient expressed concern that he may have contracted diarrhea from a female with whom he had oral sexual relations, 2-3 days prior to onset of symptoms  After admission was completed patient refused to stay the hospital   He requested that his IV be discontinued and he left the ER prior to receiving his AMA papers  Patient cursing staff when he left  [facility] resident notified         Amount and/or Complexity of Data Reviewed  Clinical lab tests: ordered and reviewed  Tests in the radiology section of CPT®: ordered and reviewed    Risk of Complications, Morbidity, and/or Mortality  Presenting problems: moderate  Diagnostic procedures: moderate  Management options: moderate          Disposition  Final diagnoses:   New onset of congestive heart failure (HCC)   NSTEMI (non-ST elevated myocardial infarction) ([facility] )   [facility]     Time reflects when diagnosis was documented in both MDM as applicable and the Disposition within this note     Time User Action Codes Description Comment    3/19/2020  7:47 PM Lyn Hernandez Add [I50 9] New onset of congestive heart failure ([facility] )     3/19/2020  7:47 PM Lyn Hernandez Add [I21 4] NSTEMI (non-ST elevated myocardial infarction) [facility]     3/19/2020  7:48 PM Alisa Nielsen Add [N17 9,  N18 9] Acute kidney injury superimposed on chronic kidney disease [facility])       ED Disposition     ED Disposition Condition Date/Time Comment    GHADA  Thu Mar 19, 2020  9:26 PM Case was discussed with Dr Susan Benton and the patient's admission status was agreed to be Admission Status: inpatient status to the service of Dr Susan Benton           Follow-up Information    None         Discharge Medication List as of 3/19/2020  9:27 PM      CONTINUE these medications which have NOT CHANGED    Details   aspirin 325 mg tablet Take 325 mg by mouth daily, Historical Med      atorvastatin (LIPITOR) 40 mg tablet Take 1 tablet (40 mg total) by mouth daily, Starting Fri 12/20/2019, Normal      benzonatate (TESSALON PERLES) 100 mg capsule Take 1 capsule (100 mg total) by mouth 3 (three) times a day as needed for cough, Starting u 3/19/2020, Normal      chlorthalidone 25 mg tablet Take 1 tablet (25 mg total) by mouth daily, Starting Fri 2/7/2020, Normal      cholecalciferol (VITAMIN D3) 1,000 units tablet Take 2,000 Units by mouth daily, Historical Med      clotrimazole-betamethasone (LOTRISONE) 1-0 05 % cream Apply to groin rash daily prn, Normal      Continuous Blood Gluc  (FREESTYLE JUVENTINO 14 DAY READER) EMMA U UTD, Historical Med      Continuous Blood Gluc Sensor (FREESTYLE JUVENTINO 14 DAY SENSOR) MISC Change every 14 days, Normal      fenofibrate (TRIGLIDE) 160 MG tablet Take 160 mg by mouth daily, Starting Wed 9/18/2019, Historical Med      glipiZIDE (GLUCOTROL) 10 mg tablet Take 1 tablet (10 mg total) by mouth 2 (two) times a day before meals With brunch and dinner, Starting Fri 1/17/2020, Normal      JANUVIA 50 MG tablet TAKE 1 TABLET(50 MG) BY MOUTH DAILY, Normal      levothyroxine 150 mcg tablet TK 1 T PO ONCE A DAY, Historical Med      metoprolol tartrate (LOPRESSOR) 100 mg tablet TAKE 1 AND 1/2 TABLETS BY MOUTH TWICE DAILY AS DIRECTED, Normal      ramipril (ALTACE) 5 mg capsule Take 1 capsule (5 mg total) by mouth daily, Starting Fri 2/7/2020, Normal      traMADol (ULTRAM) 50 mg tablet TAKE 4 TABLETS BY MOUTH AT BEDTIME AS NEEDED, Normal No discharge procedures on file      PDMP Review     None          ED Provider  Electronically Signed by           Jitendra Villareal DO  03/20/20 5850

## 2020-03-19 NOTE — PATIENT INSTRUCTIONS
[address]    Your healthcare provider and/or public health staff have evaluated you and have determined that you do not need to be hospitalized at this time  At this time you can be isolated at home where you will be monitored by staff from your local or state health department  You should carefully follow the prevention and isolation steps below until a healthcare provider or local or state health department says that you can return to your normal activities  Stay home except to get medical care    People who are mildly ill with COVID-19 are able to isolate at home during their illness  You should restrict activities outside your home, except for getting medical care  Do not go to work, school, or public areas  Avoid using public transportation, ride-sharing, or taxis  Separate yourself from other people and animals in your home    People: As much as possible, you should stay in a specific room and away from other people in your home  Also, you should use a separate bathroom, if available  Animals: You should restrict contact with pets and other animals while you are sick with COVID-19, just like you would around other people  Although there have not been reports of pets or other animals becoming sick with COVID-19, it is still recommended that people sick with COVID-19 limit contact with animals until more information is known about the virus  When possible, have another member of your household care for your animals while you are sick  If you are sick with COVID-19, avoid contact with your pet, including petting, snuggling, being kissed or licked, and sharing food  If you must care for your pet or be around animals while you are sick, wash your hands before and after you interact with pets and wear a facemask  See COVID-19 and Animals for more information      Call ahead before visiting your doctor    If you have a medical appointment, call the healthcare provider and tell them that you have or may have COVID-19  This will help the healthcare providers office take steps to keep other people from getting infected or exposed  Wear a facemask    You should wear a facemask when you are around other people (e g , sharing a room or vehicle) or pets and before you enter a healthcare providers office  If you are not able to wear a facemask (for example, because it causes trouble breathing), then people who live with you should not stay in the same room with you, or they should wear a facemask if they enter your room  Cover your coughs and sneezes    Cover your mouth and nose with a tissue when you cough or sneeze  Throw used tissues in a lined trash can  Immediately wash your hands with soap and water for at least 20 seconds or, if soap and water are not available, clean your hands with an alcohol-based hand  that contains at least 60% alcohol  Clean your hands often    Wash your hands often with soap and water for at least 20 seconds, especially after blowing your nose, coughing, or sneezing; going to the bathroom; and before eating or preparing food  If soap and water are not readily available, use an alcohol-based hand  with at least 60% alcohol, covering all surfaces of your hands and rubbing them together until they feel dry  Soap and water are the best option if hands are visibly dirty  Avoid touching your eyes, nose, and mouth with unwashed hands  Avoid sharing personal household items    You should not share dishes, drinking glasses, cups, eating utensils, towels, or bedding with other people or pets in your home  After using these items, they should be washed thoroughly with soap and water  Clean all high-touch surfaces everyday    High touch surfaces include counters, tabletops, doorknobs, bathroom fixtures, toilets, phones, keyboards, tablets, and bedside tables  Also, clean any surfaces that may have blood, stool, or body fluids on them   Use a household cleaning spray or wipe, according to the label instructions  Labels contain instructions for safe and effective use of the cleaning product including precautions you should take when applying the product, such as wearing gloves and making sure you have good ventilation during use of the product  Monitor your symptoms    Seek prompt medical attention if your illness is worsening (e g , difficulty breathing)  Before seeking care, call your healthcare provider and tell them that you have, or are being evaluated for, COVID-19  Put on a facemask before you enter the facility  These steps will help the healthcare providers office to keep other people in the office or waiting room from getting infected or exposed  Ask your healthcare provider to call the local or state health department  Persons who are placed under active monitoring or facilitated self-monitoring should follow instructions provided by their local health department or occupational health professionals, as appropriate  If you have a medical emergency and need to call 911, notify the dispatch personnel that you have, or are being evaluated for COVID-19  If possible, put on a facemask before emergency medical services arrive  Discontinuing home isolation    Patients with confirmed COVID-19 should remain under home isolation precautions until the risk of secondary transmission to others is thought to be low  The decision to discontinue home isolation precautions should be made on a case-by-case basis, in consultation with healthcare providers and state and local health departments      Source: [employer]

## 2020-03-19 NOTE — PROGRESS NOTES
[facility]        NAME: Lakesha Preston is a 69 y o  male  : 1945    MRN: [id number]  DATE: 2020  TIME: 4:09 PM    Assessment and Plan   Fever, unspecified [R50 9]  1  Fever, unspecified  Influenza A/B and RSV by PCR    Transfer to other facility    2019 Novel Coronavirus (COVID-19), [place]    CANCELED: MISC COVID-19 TEST- Office Collection   2  Shortness of breath  Influenza A/B and RSV by PCR    XR chest pa & lateral    Transfer to other facility    2019 Novel Coronavirus (COVID-19), [place]    CANCELED: MISC COVID-19 TEST- Office Collection   3  Cough  benzonatate (TESSALON PERLES) 100 mg capsule    Influenza A/B and RSV by PCR    XR chest pa & lateral    Transfer to other facility    2019 Novel Coronavirus (COVID-19), [place]    CANCELED: MISC COVID-19 TEST- Office Collection   4  Exposure to SARS-associated coronavirus  2019 Novel Coronavirus (COVID-19), [place]    CANCELED: MISC COVID-19 TEST- Collected at [facility] or [facility]   5  Diarrhea, unspecified type  2019 Novel Coronavirus (COVID-19), [place]         Patient Instructions     Discussed condition with pt  CXR shows no active disease  He has fever, cough, and shortness of breath and has history of Type 2 DM  He was swabbed for Influenza and COVID-19  I am also concerned regarding pt's weakness, diarrhea, and 13 lb weight loss and feel he needs further workup  I recommended further eval in the ER and he is agreeable  He will proceed to [facility] [place]  The ER was notified of his impending arrival and that he was swabbed for COVID-19  I prescribed him Tessalon Perles for cough  Follow up with PCP in 3-5 days  Proceed to  ER if symptoms worsen  Chief Complaint     Chief Complaint   Patient presents with    COVID-19         History of Present Illness       Pt presents with 2 week plus history of fever up to 104 1 F, weakness, sinus drainage, cough, shortness of breath, ST  Denies N/V   Has taken Advil Sinus gel caps, Advil Cold and Sinus, Nyquil, Pepto Bismol  Denies hx of asthma/allergies  He has Type 2 DM  Does not smoke or vape  He has had the flu shot  Denies recent travel or known exposure to anyone suspected or confirmed to have coronavirus  He has lost 13 lbs in the past 2 weeks and has been having watery diarrhea over this period  Review of Systems   Review of Systems   Constitutional: Positive for fever  HENT: Positive for postnasal drip and sore throat  Respiratory: Positive for cough and shortness of breath  Cardiovascular: Negative  Gastrointestinal: Positive for diarrhea  Negative for nausea and vomiting  Genitourinary: Negative  Neurological: Positive for weakness           Current Medications       Current Outpatient Medications:     aspirin 325 mg tablet, Take 325 mg by mouth daily, Disp: , Rfl:     atorvastatin (LIPITOR) 40 mg tablet, Take 1 tablet (40 mg total) by mouth daily, Disp: 90 tablet, Rfl: 3    benzonatate (TESSALON PERLES) 100 mg capsule, Take 1 capsule (100 mg total) by mouth 3 (three) times a day as needed for cough, Disp: 20 capsule, Rfl: 0    chlorthalidone 25 mg tablet, Take 1 tablet (25 mg total) by mouth daily, Disp: 90 tablet, Rfl: 3    cholecalciferol (VITAMIN D3) 1,000 units tablet, Take 2,000 Units by mouth daily, Disp: , Rfl:     clotrimazole-betamethasone (LOTRISONE) 1-0 05 % cream, Apply to groin rash daily prn, Disp: 30 g, Rfl: 3    Continuous Blood Gluc  (FREESTYLE JUVENTINO 14 DAY READER) AVANI CARTER UTD, Disp: , Rfl: 0    Continuous Blood Gluc Sensor (FREESTYLE JUVENTINO 14 DAY SENSOR) [facility], Change every 14 days, Disp: 2 each, Rfl: 11    fenofibrate (TRIGLIDE) 160 MG tablet, Take 160 mg by mouth daily, Disp: , Rfl: 2    glipiZIDE (GLUCOTROL) 10 mg tablet, Take 1 tablet (10 mg total) by mouth 2 (two) times a day before meals With brunch and dinner, Disp: 180 tablet, Rfl: 3    JANUVIA 50 MG tablet, TAKE 1 TABLET(50 MG) BY MOUTH DAILY, Disp: 90 tablet, Rfl: 1    levothyroxine 150 mcg tablet, TK 1 T PO ONCE A DAY, Disp: , Rfl: 3    metoprolol tartrate (LOPRESSOR) 100 mg tablet, TAKE 1 AND 1/2 TABLETS BY MOUTH TWICE DAILY AS DIRECTED, Disp: 270 tablet, Rfl: 0    ramipril (ALTACE) 5 mg capsule, Take 1 capsule (5 mg total) by mouth daily, Disp: 90 capsule, Rfl: 3    traMADol (ULTRAM) 50 mg tablet, TAKE 4 TABLETS BY MOUTH AT BEDTIME AS NEEDED, Disp: 120 tablet, Rfl: 0    Current Allergies     Allergies as of 03/19/2020 - Reviewed 03/19/2020   Allergen Reaction Noted    Trulicity [dulaglutide]  12/20/2019            The following portions of the patient's history were reviewed and updated as appropriate: allergies, current medications, past family history, past medical history, past social history, past surgical history and problem list      Past Medical History:   Diagnosis Date    Diabetes mellitus ([facility] [address] )     Fecal occult blood test positive 9/4/2019       Past Surgical History:   Procedure Laterality Date    ROTATOR CUFF REPAIR         History reviewed  No pertinent family history  Medications have been verified  Objective   /80   Pulse (!) 110   Temp 99 5 °F (37 5 °C)   Resp 20   SpO2 99%        Physical Exam     Physical Exam   Constitutional: He is oriented to person, place, and time  He appears well-developed and well-nourished  No distress  HENT:   Mouth/Throat: Mucous membranes are normal  Posterior oropharyngeal erythema (PND) present  No oropharyngeal exudate  No tonsillar exudate  Cardiovascular: Regular rhythm and normal heart sounds  No murmur heard  Tachycardic rate   Pulmonary/Chest: Effort normal  No tachypnea  No respiratory distress  He has no wheezes  He has rhonchi (B/L diffuse mildly coarse breath sounds heard throughout)  Neurological: He is alert and oriented to person, place, and time  Skin: There is pallor  Psychiatric: He has a normal mood and affect  Vitals reviewed

## 2020-03-19 NOTE — TELEPHONE ENCOUNTER
COVID-19 Virtual Visit   This virtual check-in was done via phone call  Encounter provider Yvonne Dunham LPN    Provider located at [address] [place] [id number]    Recent Visits  No visits were found meeting these conditions  Showing recent visits within past 7 days and meeting all other requirements     Today's Visits  Date Type Provider Dept   03/19/20 Telephone [facility]   Showing today's visits and meeting all other requirements     Future Appointments  No visits were found meeting these conditions  Showing future appointments within next 150 days and meeting all other requirements      Patient agrees to participate in a virtual check in via telephone or video visit instead of presenting to the office to address urgent/immediate medical needs  Patient is aware this is a billable service  After connecting through telephone, the patient was identified by name and date of birth  Molly Tomlin was informed that this was a telemedicine visit and that the exam was being conducted confidentially over secure lines  My office door was closed  No one else was in the room  Molly Pepipe acknowledged consent and understanding of privacy and security of the telemedicine visit  I informed the patient that I have reviewed his record in Epic and presented the opportunity for him to ask any questions regarding the visit today  The patient agreed to participate  Molly Tomlin is a 69 y o  male who is concerned about COVID-19  He reports cough  He has not traveled outside the [place]  within the last 14 days    He has not had contact with a person who is under investigation for or who is positive for COVID-19 within the last 14 days  He has not been hospitalized recently for fever and/or lower respiratory symptoms  patient did stop diabetic medication told he must restart   He is sure he has the virus, fever a week ago continues with cough,  I could not reassure  due to significant other being exposed  I did go over hand washing and  Cleaning and social distancing  That he should stay in 14 days  Transfering call to     Past Medical History:   Diagnosis Date    Fecal occult blood test positive 9/4/2019       Past Surgical History:   Procedure Laterality Date    ROTATOR CUFF REPAIR         Current Outpatient Medications   Medication Sig Dispense Refill    aspirin 325 mg tablet Take 325 mg by mouth daily      atorvastatin (LIPITOR) 40 mg tablet Take 1 tablet (40 mg total) by mouth daily 90 tablet 3    chlorthalidone 25 mg tablet Take 1 tablet (25 mg total) by mouth daily 90 tablet 3    cholecalciferol (VITAMIN D3) 1,000 units tablet Take 2,000 Units by mouth daily      clotrimazole-betamethasone (LOTRISONE) 1-0 05 % cream Apply to groin rash daily prn 30 g 3    Continuous Blood Gluc  (FREESTYLE JUVENTINO 14 DAY READER) EMMA U UTD  0    Continuous Blood Gluc Sensor (TellApartSTYLE JUVENTINO 14 DAY SENSOR) MISC Change every 14 days 2 each 11    fenofibrate (TRIGLIDE) 160 MG tablet Take 160 mg by mouth daily  2    glipiZIDE (GLUCOTROL) 10 mg tablet Take 1 tablet (10 mg total) by mouth 2 (two) times a day before meals With brunch and dinner 180 tablet 3    JANUVIA 50 MG tablet TAKE 1 TABLET(50 MG) BY MOUTH DAILY 90 tablet 1    levothyroxine 150 mcg tablet TK 1 T PO ONCE A DAY  3    metoprolol tartrate (LOPRESSOR) 100 mg tablet TAKE 1 AND 1/2 TABLETS BY MOUTH TWICE DAILY AS DIRECTED 270 tablet 0    ramipril (ALTACE) 5 mg capsule Take 1 capsule (5 mg total) by mouth daily 90 capsule 3    traMADol (ULTRAM) 50 mg tablet TAKE 4 TABLETS BY MOUTH AT BEDTIME AS NEEDED 120 tablet 0     No current facility-administered medications for this visit  Allergies   Allergen Reactions    Trulicity [Dulaglutide]      Felt very bad on it   Probably not a true allergy     H  Video Exam - NA  Phone call not video call Disposition:    I referred Sriram Patterson to one of our care now facilities for a COVID-19 swab  I spent 15 minutes with the patient during this virtual check-in visit doing counseling

## 2020-03-20 ENCOUNTER — TELEPHONE (OUTPATIENT)
Dept: OTHER | Facility: HOSPITAL | Age: 75
End: 2020-03-20

## 2020-03-20 LAB
FLUAV RNA NPH QL NAA+PROBE: NORMAL
FLUBV RNA NPH QL NAA+PROBE: NORMAL
RSV RNA NPH QL NAA+PROBE: NORMAL

## 2020-03-20 NOTE — ASSESSMENT & PLAN NOTE
· Home medications: holding Ramipril 5mg QD given renal function, continue Chlorthalidone 25mg QD, Metoprolol 150mg BID

## 2020-03-20 NOTE — ASSESSMENT & PLAN NOTE
· Home medications: continue Atorvastatin 40mg QD, holding Fenofibrate 160mg QD given renal function

## 2020-03-20 NOTE — TELEPHONE ENCOUNTER
Had c/o low energy, some loose stool and then chest/neck pain radiation to jaw  I reviewed the labs and concerns of acute MI, Covid    Had fever up to 104 1  Has cough and SOB  Refuses to come back to hospital  Strongly encouraged to quarantine himself at least  Strongly encouraged to return to Emergency Dept , especially if worse  Wear a droplet mask

## 2020-03-20 NOTE — ASSESSMENT & PLAN NOTE
Lab Results   Component Value Date    HGBA1C 7 4 (H) 12/13/2019     No results for input(s): POCGLU in the last 72 hours      Blood Sugar Average: Last 72 hrs:  · Home medications: continue Glipizide 10mg BID AC, holding Januvia 50mg QD given renal function  · ISS & accuchecks

## 2020-03-20 NOTE — PROGRESS NOTES
I was called by ER regarding admission and patient having NSTEMI along with high pro BMP along with COVID 19 rule out who was seen in Urgent care earlier that day  I had gowned up in proper PPE given COVID 19 precautions and examined patient and took history  After seeing patient and proceeding to initiate my H&P note along with putting in orders, I was prompted by ER that patient walked out AMA despite them telling him that of the severity of his condition with his NSTEMI and heart failure along with quarantine necessary due to COVID 19 rule out given his symptoms  I tried calling patient with no response and left message urging patient to please return to the ER and at the very least if not to please self quarantine himself until the results of the COVID 19 tests return

## 2020-03-20 NOTE — ASSESSMENT & PLAN NOTE
Concern for possible COVID-19 exposure  Pt reports flu-like symptoms for over two weeks, fever up to 104, dyspnea on exertion  Evaluated at Urgent Care prior to arrival at ED where he had CXR showing no acute disease and flu swab with COVID-19, results of swabs not yet available  No known contact with COVID-19     · Airborne precautions with appropriate PPE and isolation   · Awaiting COVID-19 results

## 2020-03-20 NOTE — ASSESSMENT & PLAN NOTE
Patient -- cardiac history, presenting complaint of exertional dyspnea, cough, lower extremity swelling, & likely new diagnosis of CHF with elevated BNP in ED 34,042, no previous results available, suspect type 2 MI  No chest pain at time of admission, but with EKG findings of T wave inversions in V4, V5, and V6  Troponin elevated at 0 91  CXR performed at urgent care earlier this afternoon showed no acute disease    · Received nitro & aspirin 324mg in ED, currently pain rated 1/10  · Saturating well on room air, supplemental O2 as needed  · Given lower extremity edema, increased BNP, dyspnea, given Lasix 20mg IV x1  · Continue home medication Metoprolol 150mg BID   · Monitor on telemetry   · Trend troponin  · Repeat AM EKG  · Cardiology consult

## 2020-03-20 NOTE — ED NOTES
Pt left the ED AMA  Pt stated that this nurse was "withholding his bodily functions from him"  Spoke to the pt immediately as he rang on the call bell  Explained to the pt the covid precautions we needed to take to come into his room  Pt was assisted to the bathroom in the room, had an episode of gas, and continued to get dressed to leave the ED  Attempted to explain to the pt quarantine precautions until his covid19 test came back  This pt stated that "he didn't give a shit about quarantining or other people and he would do whatever he wanted " Md spoke to the pt about CHF + elevated troponin  Pt still determined to leave AMA  IV removed  Pt walked out of the ED unassisted with mask on       Rom Mendez RN  03/19/20 [id number]

## 2020-03-20 NOTE — ASSESSMENT & PLAN NOTE
Wt Readings from Last 3 Encounters:   02/07/20 98 kg (216 lb)   09/20/19 98 9 kg (218 lb)   09/16/19 98 9 kg (218 lb)     Denies prior history of CHF, though does admit to history of lower extremity edema  BNP elevated at 32,042, no previous baseline    · Received Lasix 20mg IV x1 in ED  · Monitor I&Os, daily weights  · Echo ordered, no prior on record  · Cardiac diet with fluid restriction   · Telemetry monitoring, troponin trending, repeat EKG & Cardiology consult as above

## 2020-03-21 LAB
ATRIAL RATE: 97 BPM
P AXIS: 55 DEGREES
PR INTERVAL: 148 MS
QRS AXIS: 102 DEGREES
QRSD INTERVAL: 106 MS
QT INTERVAL: 392 MS
QTC INTERVAL: 497 MS
T WAVE AXIS: 118 DEGREES
VENTRICULAR RATE: 97 BPM

## 2020-03-21 PROCEDURE — 93010 ELECTROCARDIOGRAM REPORT: CPT | Performed by: INTERNAL MEDICINE

## 2020-03-27 ENCOUNTER — PATIENT OUTREACH (OUTPATIENT)
Dept: FAMILY MEDICINE CLINIC | Facility: CLINIC | Age: 75
End: 2020-03-27

## 2020-03-27 ENCOUNTER — TELEPHONE (OUTPATIENT)
Dept: OTHER | Facility: HOSPITAL | Age: 75
End: 2020-03-27

## 2020-03-27 DIAGNOSIS — I21.4 NSTEMI (NON-ST ELEVATED MYOCARDIAL INFARCTION) (HCC): Primary | ICD-10-CM

## 2020-03-27 LAB — SARS-COV-2 RNA SPEC QL NAA+PROBE: NOT DETECTED

## 2020-03-27 NOTE — PROGRESS NOTES
Outreach to patient  Introduced role in managing care  Explained that he was enrolled in a medicare bundle program  Pt became angry stating he knew nothing about such a program  Explained that since he left the ED AMA he might not have received the literature  Pt went off on a tangent regarding ED stay  Stated he had a carotid heart attack  Wants a carotid ultrasound  Many attempts to redirect patient unsuccessful  Offered patient the option to decline outreach  Continues on tangent about who has called him and why  Eventually patient was redirected to answer questions to recent ED stay  Pt is taking tylenol for fever control   Last temp was 96 7  No longer experiencing diarrhea  States SOB is not improved  Pt is speaking in full sentences without any noticeable difficulty  Encouraged patient to do the following: Weigh self daily and record, continue to monitor temperature, call PCP if SOB increases  Reviewed quarantine precautions  Again patient became angry when CM reviewed these guidelines  Pt angry that he does not know the results of his COVID testing  Explained current situation with testing taking time  In box message sent to Dr Sheryl Santiago and Dr Mcfarlane requesting a follow up video/virtual visit with patient

## 2020-03-28 NOTE — TELEPHONE ENCOUNTER
No fever  Mild SOB  In self quarantine  COVID 19 pending  Uses Nitroglycerin for jaw pain, which he believes is from carotid disease  I corrected him that this is likely angina from the heart  He does agree with a virtual visit and to call if any symptoms worse  H/o NSTEMI,   I will order an Echocardiogram for him to get after cleared from [place] [id number]

## 2020-04-02 ENCOUNTER — TELEPHONE (OUTPATIENT)
Dept: FAMILY MEDICINE CLINIC | Facility: CLINIC | Age: 75
End: 2020-04-02

## 2020-04-02 DIAGNOSIS — I20.9 ANGINA PECTORIS (HCC): ICD-10-CM

## 2020-04-02 DIAGNOSIS — I21.4 NSTEMI (NON-ST ELEVATED MYOCARDIAL INFARCTION) (HCC): Primary | ICD-10-CM

## 2020-04-02 RX ORDER — NITROGLYCERIN 0.4 MG/1
0.4 TABLET SUBLINGUAL
Qty: 100 TABLET | Refills: 1 | Status: SHIPPED | OUTPATIENT
Start: 2020-04-02

## 2020-04-07 ENCOUNTER — TELEPHONE (OUTPATIENT)
Dept: OTHER | Facility: HOSPITAL | Age: 75
End: 2020-04-07

## 2020-04-07 ENCOUNTER — TELEPHONE (OUTPATIENT)
Dept: FAMILY MEDICINE CLINIC | Facility: CLINIC | Age: 75
End: 2020-04-07

## 2020-04-13 ENCOUNTER — PATIENT OUTREACH (OUTPATIENT)
Dept: FAMILY MEDICINE CLINIC | Facility: CLINIC | Age: 75
End: 2020-04-13

## 2020-04-15 ENCOUNTER — HOSPITAL ENCOUNTER (INPATIENT)
Facility: HOSPITAL | Age: 75
LOS: 4 days | Discharge: HOME HEALTH | DRG: 280 | End: 2020-04-20
Attending: EMERGENCY MEDICINE | Admitting: FAMILY MEDICINE
Payer: MEDICARE

## 2020-04-15 ENCOUNTER — APPOINTMENT (EMERGENCY)
Dept: RADIOLOGY | Facility: HOSPITAL | Age: 75
DRG: 280 | End: 2020-04-15
Payer: MEDICARE

## 2020-04-15 DIAGNOSIS — E11.65 TYPE 2 DIABETES MELLITUS WITH HYPERGLYCEMIA, WITHOUT LONG-TERM CURRENT USE OF INSULIN (HCC): ICD-10-CM

## 2020-04-15 DIAGNOSIS — N17.9 ACUTE ON CHRONIC KIDNEY FAILURE (HCC): ICD-10-CM

## 2020-04-15 DIAGNOSIS — I10 HYPERTENSION, UNSPECIFIED TYPE: ICD-10-CM

## 2020-04-15 DIAGNOSIS — R06.00 DYSPNEA: ICD-10-CM

## 2020-04-15 DIAGNOSIS — I25.5 ISCHEMIC CARDIOMYOPATHY: ICD-10-CM

## 2020-04-15 DIAGNOSIS — L03.119 CELLULITIS OF LOWER EXTREMITY, UNSPECIFIED LATERALITY: ICD-10-CM

## 2020-04-15 DIAGNOSIS — L03.119 LOWER EXTREMITY CELLULITIS: Primary | ICD-10-CM

## 2020-04-15 DIAGNOSIS — N18.9 ACUTE ON CHRONIC KIDNEY FAILURE (HCC): ICD-10-CM

## 2020-04-15 DIAGNOSIS — I21.4 NSTEMI (NON-ST ELEVATED MYOCARDIAL INFARCTION) (HCC): ICD-10-CM

## 2020-04-15 DIAGNOSIS — I50.21 ACUTE SYSTOLIC CHF (CONGESTIVE HEART FAILURE) (HCC): ICD-10-CM

## 2020-04-15 DIAGNOSIS — N18.4 CKD (CHRONIC KIDNEY DISEASE) STAGE 4, GFR 15-29 ML/MIN (HCC): ICD-10-CM

## 2020-04-15 DIAGNOSIS — E11.9 TYPE 2 DIABETES MELLITUS (HCC): ICD-10-CM

## 2020-04-15 DIAGNOSIS — I25.2 H/O NON-ST ELEVATION MYOCARDIAL INFARCTION (NSTEMI): ICD-10-CM

## 2020-04-15 DIAGNOSIS — N18.30 CKD (CHRONIC KIDNEY DISEASE) STAGE 3, GFR 30-59 ML/MIN (HCC): ICD-10-CM

## 2020-04-15 DIAGNOSIS — S81.801A WOUND OF RIGHT LEG: ICD-10-CM

## 2020-04-15 DIAGNOSIS — I48.91 NEW ONSET ATRIAL FIBRILLATION (HCC): ICD-10-CM

## 2020-04-15 DIAGNOSIS — I73.9 PERIPHERAL ARTERIAL DISEASE (HCC): ICD-10-CM

## 2020-04-15 DIAGNOSIS — I50.9 CHF (CONGESTIVE HEART FAILURE) (HCC): ICD-10-CM

## 2020-04-15 DIAGNOSIS — S81.802A MULTIPLE OPEN WOUNDS OF LEFT LOWER LEG: ICD-10-CM

## 2020-04-15 PROBLEM — L03.115 CELLULITIS OF RIGHT LEG: Status: ACTIVE | Noted: 2020-04-15

## 2020-04-15 PROBLEM — R77.8 ELEVATED TROPONIN: Status: ACTIVE | Noted: 2020-04-15

## 2020-04-15 PROBLEM — R79.89 ELEVATED TROPONIN: Status: ACTIVE | Noted: 2020-04-15

## 2020-04-15 LAB
ALBUMIN SERPL BCP-MCNC: 3 G/DL (ref 3.5–5)
ALP SERPL-CCNC: 57 U/L (ref 46–116)
ALT SERPL W P-5'-P-CCNC: 20 U/L (ref 12–78)
ANION GAP SERPL CALCULATED.3IONS-SCNC: 11 MMOL/L (ref 4–13)
APTT PPP: 39 SECONDS (ref 23–37)
AST SERPL W P-5'-P-CCNC: 23 U/L (ref 5–45)
BACTERIA UR QL AUTO: ABNORMAL /HPF
BASOPHILS # BLD AUTO: 0.07 THOUSANDS/ΜL (ref 0–0.1)
BASOPHILS NFR BLD AUTO: 1 % (ref 0–1)
BILIRUB SERPL-MCNC: 0.4 MG/DL (ref 0.2–1)
BILIRUB UR QL STRIP: NEGATIVE
BUN SERPL-MCNC: 63 MG/DL (ref 5–25)
CALCIUM SERPL-MCNC: 8.5 MG/DL (ref 8.3–10.1)
CHLORIDE SERPL-SCNC: 105 MMOL/L (ref 100–108)
CLARITY UR: CLEAR
CO2 SERPL-SCNC: 23 MMOL/L (ref 21–32)
COLOR UR: YELLOW
CREAT SERPL-MCNC: 2.83 MG/DL (ref 0.6–1.3)
EOSINOPHIL # BLD AUTO: 0.14 THOUSAND/ΜL (ref 0–0.61)
EOSINOPHIL NFR BLD AUTO: 2 % (ref 0–6)
ERYTHROCYTE [DISTWIDTH] IN BLOOD BY AUTOMATED COUNT: 14.2 % (ref 11.6–15.1)
EST. AVERAGE GLUCOSE BLD GHB EST-MCNC: 163 MG/DL
GFR SERPL CREATININE-BSD FRML MDRD: 21 ML/MIN/1.73SQ M
GLUCOSE SERPL-MCNC: 172 MG/DL (ref 65–140)
GLUCOSE SERPL-MCNC: 227 MG/DL (ref 65–140)
GLUCOSE SERPL-MCNC: 283 MG/DL (ref 65–140)
GLUCOSE UR STRIP-MCNC: ABNORMAL MG/DL
HBA1C MFR BLD: 7.3 %
HCT VFR BLD AUTO: 39.3 % (ref 36.5–49.3)
HGB BLD-MCNC: 12.7 G/DL (ref 12–17)
HGB UR QL STRIP.AUTO: ABNORMAL
IMM GRANULOCYTES # BLD AUTO: 0.02 THOUSAND/UL (ref 0–0.2)
IMM GRANULOCYTES NFR BLD AUTO: 0 % (ref 0–2)
INR PPP: 1.14 (ref 0.84–1.19)
KETONES UR STRIP-MCNC: NEGATIVE MG/DL
LACTATE SERPL-SCNC: 1.8 MMOL/L (ref 0.5–2)
LEUKOCYTE ESTERASE UR QL STRIP: NEGATIVE
LYMPHOCYTES # BLD AUTO: 1.26 THOUSANDS/ΜL (ref 0.6–4.47)
LYMPHOCYTES NFR BLD AUTO: 18 % (ref 14–44)
MAGNESIUM SERPL-MCNC: 2.1 MG/DL (ref 1.6–2.6)
MCH RBC QN AUTO: 30.2 PG (ref 26.8–34.3)
MCHC RBC AUTO-ENTMCNC: 32.3 G/DL (ref 31.4–37.4)
MCV RBC AUTO: 94 FL (ref 82–98)
MONOCYTES # BLD AUTO: 0.68 THOUSAND/ΜL (ref 0.17–1.22)
MONOCYTES NFR BLD AUTO: 10 % (ref 4–12)
NEUTROPHILS # BLD AUTO: 4.69 THOUSANDS/ΜL (ref 1.85–7.62)
NEUTS SEG NFR BLD AUTO: 69 % (ref 43–75)
NITRITE UR QL STRIP: NEGATIVE
NON-SQ EPI CELLS URNS QL MICRO: ABNORMAL /HPF
NRBC BLD AUTO-RTO: 0 /100 WBCS
NT-PROBNP SERPL-MCNC: ABNORMAL PG/ML
PH UR STRIP.AUTO: 5 [PH]
PHOSPHATE SERPL-MCNC: 3 MG/DL (ref 2.3–4.1)
PLATELET # BLD AUTO: 207 THOUSANDS/UL (ref 149–390)
PMV BLD AUTO: 10.8 FL (ref 8.9–12.7)
POTASSIUM SERPL-SCNC: 4.2 MMOL/L (ref 3.5–5.3)
PROT SERPL-MCNC: 6.8 G/DL (ref 6.4–8.2)
PROT UR STRIP-MCNC: ABNORMAL MG/DL
PROTHROMBIN TIME: 14.9 SECONDS (ref 11.6–14.5)
RBC # BLD AUTO: 4.2 MILLION/UL (ref 3.88–5.62)
RBC #/AREA URNS AUTO: ABNORMAL /HPF
SARS-COV-2 RNA RESP QL NAA+PROBE: NEGATIVE
SODIUM SERPL-SCNC: 139 MMOL/L (ref 136–145)
SP GR UR STRIP.AUTO: 1.02 (ref 1–1.03)
T4 FREE SERPL-MCNC: 1.22 NG/DL (ref 0.76–1.46)
TROPONIN I SERPL-MCNC: 0.08 NG/ML
TROPONIN I SERPL-MCNC: 0.1 NG/ML
TROPONIN I SERPL-MCNC: 0.12 NG/ML
TSH SERPL DL<=0.05 MIU/L-ACNC: 6.35 UIU/ML (ref 0.36–3.74)
UROBILINOGEN UR QL STRIP.AUTO: 0.2 E.U./DL
WBC # BLD AUTO: 6.86 THOUSAND/UL (ref 4.31–10.16)
WBC #/AREA URNS AUTO: ABNORMAL /HPF

## 2020-04-15 PROCEDURE — 87205 SMEAR GRAM STAIN: CPT | Performed by: EMERGENCY MEDICINE

## 2020-04-15 PROCEDURE — 84484 ASSAY OF TROPONIN QUANT: CPT | Performed by: STUDENT IN AN ORGANIZED HEALTH CARE EDUCATION/TRAINING PROGRAM

## 2020-04-15 PROCEDURE — 83605 ASSAY OF LACTIC ACID: CPT | Performed by: EMERGENCY MEDICINE

## 2020-04-15 PROCEDURE — 87070 CULTURE OTHR SPECIMN AEROBIC: CPT | Performed by: EMERGENCY MEDICINE

## 2020-04-15 PROCEDURE — 85610 PROTHROMBIN TIME: CPT | Performed by: EMERGENCY MEDICINE

## 2020-04-15 PROCEDURE — 84443 ASSAY THYROID STIM HORMONE: CPT | Performed by: EMERGENCY MEDICINE

## 2020-04-15 PROCEDURE — 84100 ASSAY OF PHOSPHORUS: CPT | Performed by: EMERGENCY MEDICINE

## 2020-04-15 PROCEDURE — 93005 ELECTROCARDIOGRAM TRACING: CPT

## 2020-04-15 PROCEDURE — 71045 X-RAY EXAM CHEST 1 VIEW: CPT

## 2020-04-15 PROCEDURE — 99285 EMERGENCY DEPT VISIT HI MDM: CPT

## 2020-04-15 PROCEDURE — 99221 1ST HOSP IP/OBS SF/LOW 40: CPT | Performed by: FAMILY MEDICINE

## 2020-04-15 PROCEDURE — 87147 CULTURE TYPE IMMUNOLOGIC: CPT | Performed by: EMERGENCY MEDICINE

## 2020-04-15 PROCEDURE — 36415 COLL VENOUS BLD VENIPUNCTURE: CPT | Performed by: EMERGENCY MEDICINE

## 2020-04-15 PROCEDURE — 84484 ASSAY OF TROPONIN QUANT: CPT | Performed by: EMERGENCY MEDICINE

## 2020-04-15 PROCEDURE — 85730 THROMBOPLASTIN TIME PARTIAL: CPT | Performed by: EMERGENCY MEDICINE

## 2020-04-15 PROCEDURE — 82948 REAGENT STRIP/BLOOD GLUCOSE: CPT

## 2020-04-15 PROCEDURE — 87635 SARS-COV-2 COVID-19 AMP PRB: CPT | Performed by: EMERGENCY MEDICINE

## 2020-04-15 PROCEDURE — 81001 URINALYSIS AUTO W/SCOPE: CPT | Performed by: STUDENT IN AN ORGANIZED HEALTH CARE EDUCATION/TRAINING PROGRAM

## 2020-04-15 PROCEDURE — 87077 CULTURE AEROBIC IDENTIFY: CPT | Performed by: EMERGENCY MEDICINE

## 2020-04-15 PROCEDURE — 99285 EMERGENCY DEPT VISIT HI MDM: CPT | Performed by: EMERGENCY MEDICINE

## 2020-04-15 PROCEDURE — 87040 BLOOD CULTURE FOR BACTERIA: CPT | Performed by: EMERGENCY MEDICINE

## 2020-04-15 PROCEDURE — 84439 ASSAY OF FREE THYROXINE: CPT | Performed by: EMERGENCY MEDICINE

## 2020-04-15 PROCEDURE — 83735 ASSAY OF MAGNESIUM: CPT | Performed by: EMERGENCY MEDICINE

## 2020-04-15 PROCEDURE — 83036 HEMOGLOBIN GLYCOSYLATED A1C: CPT | Performed by: STUDENT IN AN ORGANIZED HEALTH CARE EDUCATION/TRAINING PROGRAM

## 2020-04-15 PROCEDURE — 85025 COMPLETE CBC W/AUTO DIFF WBC: CPT | Performed by: EMERGENCY MEDICINE

## 2020-04-15 PROCEDURE — 87186 SC STD MICRODIL/AGAR DIL: CPT | Performed by: EMERGENCY MEDICINE

## 2020-04-15 PROCEDURE — 83880 ASSAY OF NATRIURETIC PEPTIDE: CPT | Performed by: EMERGENCY MEDICINE

## 2020-04-15 PROCEDURE — 80053 COMPREHEN METABOLIC PANEL: CPT | Performed by: EMERGENCY MEDICINE

## 2020-04-15 PROCEDURE — 87076 CULTURE ANAEROBE IDENT EACH: CPT | Performed by: EMERGENCY MEDICINE

## 2020-04-15 RX ORDER — CLOTRIMAZOLE AND BETAMETHASONE DIPROPIONATE 10; .64 MG/G; MG/G
CREAM TOPICAL DAILY PRN
Status: DISCONTINUED | OUTPATIENT
Start: 2020-04-15 | End: 2020-04-20 | Stop reason: HOSPADM

## 2020-04-15 RX ORDER — ASPIRIN 81 MG/1
81 TABLET, CHEWABLE ORAL DAILY
Status: DISCONTINUED | OUTPATIENT
Start: 2020-04-15 | End: 2020-04-20

## 2020-04-15 RX ORDER — LEVOTHYROXINE SODIUM 0.15 MG/1
150 TABLET ORAL
Status: DISCONTINUED | OUTPATIENT
Start: 2020-04-16 | End: 2020-04-20 | Stop reason: HOSPADM

## 2020-04-15 RX ORDER — CHLORTHALIDONE 25 MG/1
25 TABLET ORAL DAILY
Status: DISCONTINUED | OUTPATIENT
Start: 2020-04-15 | End: 2020-04-16

## 2020-04-15 RX ORDER — GLIPIZIDE 5 MG/1
10 TABLET ORAL
Status: DISCONTINUED | OUTPATIENT
Start: 2020-04-15 | End: 2020-04-20

## 2020-04-15 RX ORDER — TRAMADOL HYDROCHLORIDE 50 MG/1
200 TABLET ORAL
Status: DISCONTINUED | OUTPATIENT
Start: 2020-04-15 | End: 2020-04-18

## 2020-04-15 RX ORDER — ACETAMINOPHEN 325 MG/1
650 TABLET ORAL EVERY 6 HOURS PRN
Status: DISCONTINUED | OUTPATIENT
Start: 2020-04-15 | End: 2020-04-17

## 2020-04-15 RX ORDER — ATORVASTATIN CALCIUM 40 MG/1
40 TABLET, FILM COATED ORAL
Status: DISCONTINUED | OUTPATIENT
Start: 2020-04-15 | End: 2020-04-20 | Stop reason: HOSPADM

## 2020-04-15 RX ORDER — FUROSEMIDE 10 MG/ML
20 INJECTION INTRAMUSCULAR; INTRAVENOUS ONCE
Status: COMPLETED | OUTPATIENT
Start: 2020-04-15 | End: 2020-04-15

## 2020-04-15 RX ORDER — MELATONIN
2000 DAILY
Status: DISCONTINUED | OUTPATIENT
Start: 2020-04-15 | End: 2020-04-20 | Stop reason: HOSPADM

## 2020-04-15 RX ORDER — TRAMADOL HYDROCHLORIDE 50 MG/1
200 TABLET ORAL
Status: DISCONTINUED | OUTPATIENT
Start: 2020-04-15 | End: 2020-04-15

## 2020-04-15 RX ORDER — TRAMADOL HYDROCHLORIDE 50 MG/1
50 TABLET ORAL EVERY 6 HOURS PRN
Status: DISCONTINUED | OUTPATIENT
Start: 2020-04-15 | End: 2020-04-15

## 2020-04-15 RX ORDER — LISINOPRIL 20 MG/1
20 TABLET ORAL DAILY
Status: DISCONTINUED | OUTPATIENT
Start: 2020-04-15 | End: 2020-04-16

## 2020-04-15 RX ADMIN — METOPROLOL TARTRATE 150 MG: 50 TABLET, FILM COATED ORAL at 20:50

## 2020-04-15 RX ADMIN — TRAMADOL HYDROCHLORIDE 200 MG: 50 TABLET, FILM COATED ORAL at 17:12

## 2020-04-15 RX ADMIN — FUROSEMIDE 20 MG: 10 INJECTION, SOLUTION INTRAMUSCULAR; INTRAVENOUS at 15:37

## 2020-04-15 RX ADMIN — ATORVASTATIN CALCIUM 40 MG: 40 TABLET, FILM COATED ORAL at 15:37

## 2020-04-15 RX ADMIN — AMPICILLIN AND SULBACTAM 3 G: 2; 1 INJECTION, POWDER, FOR SOLUTION INTRAMUSCULAR; INTRAVENOUS at 14:19

## 2020-04-15 RX ADMIN — INSULIN LISPRO 2 UNITS: 100 INJECTION, SOLUTION INTRAVENOUS; SUBCUTANEOUS at 16:23

## 2020-04-15 RX ADMIN — ASPIRIN 81 MG 81 MG: 81 TABLET ORAL at 15:42

## 2020-04-15 RX ADMIN — CHLORTHALIDONE 25 MG: 25 TABLET ORAL at 15:37

## 2020-04-15 RX ADMIN — INSULIN LISPRO 1 UNITS: 100 INJECTION, SOLUTION INTRAVENOUS; SUBCUTANEOUS at 21:09

## 2020-04-15 RX ADMIN — GLIPIZIDE 10 MG: 5 TABLET ORAL at 15:37

## 2020-04-15 RX ADMIN — VITAMIN D, TAB 1000IU (100/BT) 2000 UNITS: 25 TAB at 15:37

## 2020-04-15 RX ADMIN — LISINOPRIL 20 MG: 20 TABLET ORAL at 15:37

## 2020-04-16 ENCOUNTER — APPOINTMENT (OUTPATIENT)
Dept: RADIOLOGY | Facility: HOSPITAL | Age: 75
DRG: 280 | End: 2020-04-16
Payer: MEDICARE

## 2020-04-16 ENCOUNTER — APPOINTMENT (INPATIENT)
Dept: NON INVASIVE DIAGNOSTICS | Facility: HOSPITAL | Age: 75
DRG: 280 | End: 2020-04-16
Payer: MEDICARE

## 2020-04-16 LAB
ALBUMIN SERPL BCP-MCNC: 2.6 G/DL (ref 3.5–5)
ALP SERPL-CCNC: 45 U/L (ref 46–116)
ALT SERPL W P-5'-P-CCNC: 18 U/L (ref 12–78)
ANION GAP SERPL CALCULATED.3IONS-SCNC: 8 MMOL/L (ref 4–13)
AST SERPL W P-5'-P-CCNC: 20 U/L (ref 5–45)
ATRIAL RATE: 127 BPM
ATRIAL RATE: 144 BPM
ATRIAL RATE: 96 BPM
BASOPHILS # BLD AUTO: 0.06 THOUSANDS/ΜL (ref 0–0.1)
BASOPHILS NFR BLD AUTO: 1 % (ref 0–1)
BILIRUB SERPL-MCNC: 0.5 MG/DL (ref 0.2–1)
BUN SERPL-MCNC: 59 MG/DL (ref 5–25)
CALCIUM SERPL-MCNC: 8.4 MG/DL (ref 8.3–10.1)
CHEST PAIN STATEMENT: NORMAL
CHLORIDE SERPL-SCNC: 107 MMOL/L (ref 100–108)
CO2 SERPL-SCNC: 24 MMOL/L (ref 21–32)
CREAT SERPL-MCNC: 2.61 MG/DL (ref 0.6–1.3)
EOSINOPHIL # BLD AUTO: 0.18 THOUSAND/ΜL (ref 0–0.61)
EOSINOPHIL NFR BLD AUTO: 2 % (ref 0–6)
ERYTHROCYTE [DISTWIDTH] IN BLOOD BY AUTOMATED COUNT: 14.2 % (ref 11.6–15.1)
GFR SERPL CREATININE-BSD FRML MDRD: 23 ML/MIN/1.73SQ M
GLUCOSE SERPL-MCNC: 133 MG/DL (ref 65–140)
GLUCOSE SERPL-MCNC: 147 MG/DL (ref 65–140)
GLUCOSE SERPL-MCNC: 158 MG/DL (ref 65–140)
GLUCOSE SERPL-MCNC: 175 MG/DL (ref 65–140)
GLUCOSE SERPL-MCNC: 198 MG/DL (ref 65–140)
HCT VFR BLD AUTO: 36.8 % (ref 36.5–49.3)
HGB BLD-MCNC: 11.7 G/DL (ref 12–17)
IMM GRANULOCYTES # BLD AUTO: 0.04 THOUSAND/UL (ref 0–0.2)
IMM GRANULOCYTES NFR BLD AUTO: 1 % (ref 0–2)
LYMPHOCYTES # BLD AUTO: 1.35 THOUSANDS/ΜL (ref 0.6–4.47)
LYMPHOCYTES NFR BLD AUTO: 17 % (ref 14–44)
MAGNESIUM SERPL-MCNC: 2 MG/DL (ref 1.6–2.6)
MAX DIASTOLIC BP: 66 MMHG
MAX HEART RATE: 64 BPM
MAX PREDICTED HEART RATE: 147 BPM
MAX. SYSTOLIC BP: 134 MMHG
MCH RBC QN AUTO: 29.8 PG (ref 26.8–34.3)
MCHC RBC AUTO-ENTMCNC: 31.8 G/DL (ref 31.4–37.4)
MCV RBC AUTO: 94 FL (ref 82–98)
MONOCYTES # BLD AUTO: 0.87 THOUSAND/ΜL (ref 0.17–1.22)
MONOCYTES NFR BLD AUTO: 11 % (ref 4–12)
NEUTROPHILS # BLD AUTO: 5.3 THOUSANDS/ΜL (ref 1.85–7.62)
NEUTS SEG NFR BLD AUTO: 68 % (ref 43–75)
NRBC BLD AUTO-RTO: 0 /100 WBCS
P AXIS: 54 DEGREES
PHOSPHATE SERPL-MCNC: 3 MG/DL (ref 2.3–4.1)
PLATELET # BLD AUTO: 195 THOUSANDS/UL (ref 149–390)
PMV BLD AUTO: 10.7 FL (ref 8.9–12.7)
POTASSIUM SERPL-SCNC: 4.3 MMOL/L (ref 3.5–5.3)
PR INTERVAL: 170 MS
PROT SERPL-MCNC: 5.8 G/DL (ref 6.4–8.2)
PROTOCOL NAME: NORMAL
QRS AXIS: 145 DEGREES
QRS AXIS: 145 DEGREES
QRS AXIS: 92 DEGREES
QRSD INTERVAL: 102 MS
QT INTERVAL: 322 MS
QT INTERVAL: 328 MS
QT INTERVAL: 364 MS
QTC INTERVAL: 459 MS
QTC INTERVAL: 477 MS
QTC INTERVAL: 480 MS
RBC # BLD AUTO: 3.93 MILLION/UL (ref 3.88–5.62)
REASON FOR TERMINATION: NORMAL
SODIUM SERPL-SCNC: 139 MMOL/L (ref 136–145)
T WAVE AXIS: -44 DEGREES
T WAVE AXIS: -53 DEGREES
T WAVE AXIS: 107 DEGREES
TARGET HR FORMULA: NORMAL
TEST INDICATION: NORMAL
TIME IN EXERCISE PHASE: NORMAL
TROPONIN I SERPL-MCNC: 0.12 NG/ML
TROPONIN I SERPL-MCNC: 0.16 NG/ML
TROPONIN I SERPL-MCNC: 0.16 NG/ML
TROPONIN I SERPL-MCNC: 0.17 NG/ML
VENTRICULAR RATE: 129 BPM
VENTRICULAR RATE: 132 BPM
VENTRICULAR RATE: 96 BPM
WBC # BLD AUTO: 7.8 THOUSAND/UL (ref 4.31–10.16)

## 2020-04-16 PROCEDURE — 93306 TTE W/DOPPLER COMPLETE: CPT

## 2020-04-16 PROCEDURE — A9502 TC99M TETROFOSMIN: HCPCS

## 2020-04-16 PROCEDURE — 80053 COMPREHEN METABOLIC PANEL: CPT | Performed by: STUDENT IN AN ORGANIZED HEALTH CARE EDUCATION/TRAINING PROGRAM

## 2020-04-16 PROCEDURE — 93306 TTE W/DOPPLER COMPLETE: CPT | Performed by: INTERNAL MEDICINE

## 2020-04-16 PROCEDURE — 93016 CV STRESS TEST SUPVJ ONLY: CPT | Performed by: INTERNAL MEDICINE

## 2020-04-16 PROCEDURE — 93017 CV STRESS TEST TRACING ONLY: CPT

## 2020-04-16 PROCEDURE — 99232 SBSQ HOSP IP/OBS MODERATE 35: CPT | Performed by: FAMILY MEDICINE

## 2020-04-16 PROCEDURE — 99223 1ST HOSP IP/OBS HIGH 75: CPT | Performed by: INTERNAL MEDICINE

## 2020-04-16 PROCEDURE — 85025 COMPLETE CBC W/AUTO DIFF WBC: CPT | Performed by: STUDENT IN AN ORGANIZED HEALTH CARE EDUCATION/TRAINING PROGRAM

## 2020-04-16 PROCEDURE — 93010 ELECTROCARDIOGRAM REPORT: CPT | Performed by: INTERNAL MEDICINE

## 2020-04-16 PROCEDURE — 84484 ASSAY OF TROPONIN QUANT: CPT | Performed by: STUDENT IN AN ORGANIZED HEALTH CARE EDUCATION/TRAINING PROGRAM

## 2020-04-16 PROCEDURE — 84100 ASSAY OF PHOSPHORUS: CPT | Performed by: STUDENT IN AN ORGANIZED HEALTH CARE EDUCATION/TRAINING PROGRAM

## 2020-04-16 PROCEDURE — 78452 HT MUSCLE IMAGE SPECT MULT: CPT | Performed by: INTERNAL MEDICINE

## 2020-04-16 PROCEDURE — 83735 ASSAY OF MAGNESIUM: CPT | Performed by: STUDENT IN AN ORGANIZED HEALTH CARE EDUCATION/TRAINING PROGRAM

## 2020-04-16 PROCEDURE — 82948 REAGENT STRIP/BLOOD GLUCOSE: CPT

## 2020-04-16 PROCEDURE — 93018 CV STRESS TEST I&R ONLY: CPT | Performed by: INTERNAL MEDICINE

## 2020-04-16 PROCEDURE — 78452 HT MUSCLE IMAGE SPECT MULT: CPT

## 2020-04-16 RX ORDER — LISINOPRIL 10 MG/1
10 TABLET ORAL DAILY
Status: DISCONTINUED | OUTPATIENT
Start: 2020-04-17 | End: 2020-04-17

## 2020-04-16 RX ORDER — ISOSORBIDE MONONITRATE 30 MG/1
30 TABLET, EXTENDED RELEASE ORAL DAILY
Status: DISCONTINUED | OUTPATIENT
Start: 2020-04-16 | End: 2020-04-20 | Stop reason: HOSPADM

## 2020-04-16 RX ADMIN — ASPIRIN 81 MG 81 MG: 81 TABLET ORAL at 08:00

## 2020-04-16 RX ADMIN — VITAMIN D, TAB 1000IU (100/BT) 2000 UNITS: 25 TAB at 08:00

## 2020-04-16 RX ADMIN — CHLORTHALIDONE 25 MG: 25 TABLET ORAL at 08:00

## 2020-04-16 RX ADMIN — INSULIN LISPRO 1 UNITS: 100 INJECTION, SOLUTION INTRAVENOUS; SUBCUTANEOUS at 21:39

## 2020-04-16 RX ADMIN — AMPICILLIN AND SULBACTAM 3 G: 2; 1 INJECTION, POWDER, FOR SOLUTION INTRAMUSCULAR; INTRAVENOUS at 02:28

## 2020-04-16 RX ADMIN — INSULIN LISPRO 1 UNITS: 100 INJECTION, SOLUTION INTRAVENOUS; SUBCUTANEOUS at 16:39

## 2020-04-16 RX ADMIN — TRAMADOL HYDROCHLORIDE 200 MG: 50 TABLET, FILM COATED ORAL at 21:50

## 2020-04-16 RX ADMIN — LEVOTHYROXINE SODIUM 150 MCG: 150 TABLET ORAL at 05:42

## 2020-04-16 RX ADMIN — ENOXAPARIN SODIUM 30 MG: 30 INJECTION SUBCUTANEOUS at 09:11

## 2020-04-16 RX ADMIN — METOPROLOL TARTRATE 150 MG: 50 TABLET, FILM COATED ORAL at 08:00

## 2020-04-16 RX ADMIN — METOPROLOL SUCCINATE 150 MG: 100 TABLET, EXTENDED RELEASE ORAL at 17:23

## 2020-04-16 RX ADMIN — AMPICILLIN AND SULBACTAM 3 G: 2; 1 INJECTION, POWDER, FOR SOLUTION INTRAMUSCULAR; INTRAVENOUS at 13:05

## 2020-04-16 RX ADMIN — REGADENOSON 0.4 MG: 0.08 INJECTION, SOLUTION INTRAVENOUS at 10:03

## 2020-04-16 RX ADMIN — GLIPIZIDE 10 MG: 5 TABLET ORAL at 16:37

## 2020-04-16 RX ADMIN — ATORVASTATIN CALCIUM 40 MG: 40 TABLET, FILM COATED ORAL at 17:23

## 2020-04-16 RX ADMIN — ISOSORBIDE MONONITRATE 30 MG: 30 TABLET, EXTENDED RELEASE ORAL at 13:06

## 2020-04-16 RX ADMIN — LISINOPRIL 20 MG: 20 TABLET ORAL at 08:00

## 2020-04-17 ENCOUNTER — APPOINTMENT (INPATIENT)
Dept: RADIOLOGY | Facility: HOSPITAL | Age: 75
DRG: 280 | End: 2020-04-17
Payer: MEDICARE

## 2020-04-17 PROBLEM — I25.5 ISCHEMIC CARDIOMYOPATHY: Status: ACTIVE | Noted: 2020-04-15

## 2020-04-17 PROBLEM — I50.21 ACUTE SYSTOLIC CHF (CONGESTIVE HEART FAILURE) (HCC): Status: ACTIVE | Noted: 2020-04-15

## 2020-04-17 LAB
ANION GAP SERPL CALCULATED.3IONS-SCNC: 8 MMOL/L (ref 4–13)
ANION GAP SERPL CALCULATED.3IONS-SCNC: 9 MMOL/L (ref 4–13)
BASOPHILS # BLD AUTO: 0.12 THOUSANDS/ΜL (ref 0–0.1)
BASOPHILS NFR BLD AUTO: 2 % (ref 0–1)
BUN SERPL-MCNC: 66 MG/DL (ref 5–25)
BUN SERPL-MCNC: 66 MG/DL (ref 5–25)
CALCIUM SERPL-MCNC: 8.4 MG/DL (ref 8.3–10.1)
CALCIUM SERPL-MCNC: 8.9 MG/DL (ref 8.3–10.1)
CHLORIDE SERPL-SCNC: 106 MMOL/L (ref 100–108)
CHLORIDE SERPL-SCNC: 107 MMOL/L (ref 100–108)
CO2 SERPL-SCNC: 24 MMOL/L (ref 21–32)
CO2 SERPL-SCNC: 27 MMOL/L (ref 21–32)
CREAT SERPL-MCNC: 3.05 MG/DL (ref 0.6–1.3)
CREAT SERPL-MCNC: 3.06 MG/DL (ref 0.6–1.3)
EOSINOPHIL # BLD AUTO: 0.39 THOUSAND/ΜL (ref 0–0.61)
EOSINOPHIL NFR BLD AUTO: 5 % (ref 0–6)
ERYTHROCYTE [DISTWIDTH] IN BLOOD BY AUTOMATED COUNT: 14.4 % (ref 11.6–15.1)
GFR SERPL CREATININE-BSD FRML MDRD: 19 ML/MIN/1.73SQ M
GFR SERPL CREATININE-BSD FRML MDRD: 19 ML/MIN/1.73SQ M
GLUCOSE SERPL-MCNC: 174 MG/DL (ref 65–140)
GLUCOSE SERPL-MCNC: 175 MG/DL (ref 65–140)
GLUCOSE SERPL-MCNC: 181 MG/DL (ref 65–140)
GLUCOSE SERPL-MCNC: 62 MG/DL (ref 65–140)
GLUCOSE SERPL-MCNC: 74 MG/DL (ref 65–140)
GLUCOSE SERPL-MCNC: 76 MG/DL (ref 65–140)
GLUCOSE SERPL-MCNC: 81 MG/DL (ref 65–140)
HCT VFR BLD AUTO: 36.2 % (ref 36.5–49.3)
HGB BLD-MCNC: 11.4 G/DL (ref 12–17)
IMM GRANULOCYTES # BLD AUTO: 0.03 THOUSAND/UL (ref 0–0.2)
IMM GRANULOCYTES NFR BLD AUTO: 0 % (ref 0–2)
LYMPHOCYTES # BLD AUTO: 1.74 THOUSANDS/ΜL (ref 0.6–4.47)
LYMPHOCYTES NFR BLD AUTO: 22 % (ref 14–44)
MAGNESIUM SERPL-MCNC: 2.1 MG/DL (ref 1.6–2.6)
MCH RBC QN AUTO: 30.8 PG (ref 26.8–34.3)
MCHC RBC AUTO-ENTMCNC: 31.5 G/DL (ref 31.4–37.4)
MCV RBC AUTO: 98 FL (ref 82–98)
MONOCYTES # BLD AUTO: 1.1 THOUSAND/ΜL (ref 0.17–1.22)
MONOCYTES NFR BLD AUTO: 14 % (ref 4–12)
NEUTROPHILS # BLD AUTO: 4.57 THOUSANDS/ΜL (ref 1.85–7.62)
NEUTS SEG NFR BLD AUTO: 57 % (ref 43–75)
NRBC BLD AUTO-RTO: 0 /100 WBCS
PHOSPHATE SERPL-MCNC: 3.4 MG/DL (ref 2.3–4.1)
PLATELET # BLD AUTO: 191 THOUSANDS/UL (ref 149–390)
PMV BLD AUTO: 10.9 FL (ref 8.9–12.7)
POTASSIUM SERPL-SCNC: 4 MMOL/L (ref 3.5–5.3)
POTASSIUM SERPL-SCNC: 4.2 MMOL/L (ref 3.5–5.3)
RBC # BLD AUTO: 3.7 MILLION/UL (ref 3.88–5.62)
SODIUM SERPL-SCNC: 140 MMOL/L (ref 136–145)
SODIUM SERPL-SCNC: 141 MMOL/L (ref 136–145)
WBC # BLD AUTO: 7.95 THOUSAND/UL (ref 4.31–10.16)

## 2020-04-17 PROCEDURE — 80048 BASIC METABOLIC PNL TOTAL CA: CPT | Performed by: STUDENT IN AN ORGANIZED HEALTH CARE EDUCATION/TRAINING PROGRAM

## 2020-04-17 PROCEDURE — 82948 REAGENT STRIP/BLOOD GLUCOSE: CPT

## 2020-04-17 PROCEDURE — 83735 ASSAY OF MAGNESIUM: CPT | Performed by: STUDENT IN AN ORGANIZED HEALTH CARE EDUCATION/TRAINING PROGRAM

## 2020-04-17 PROCEDURE — 99232 SBSQ HOSP IP/OBS MODERATE 35: CPT | Performed by: INTERNAL MEDICINE

## 2020-04-17 PROCEDURE — 99232 SBSQ HOSP IP/OBS MODERATE 35: CPT | Performed by: FAMILY MEDICINE

## 2020-04-17 PROCEDURE — 76770 US EXAM ABDO BACK WALL COMP: CPT

## 2020-04-17 PROCEDURE — 99223 1ST HOSP IP/OBS HIGH 75: CPT | Performed by: INTERNAL MEDICINE

## 2020-04-17 PROCEDURE — 84100 ASSAY OF PHOSPHORUS: CPT | Performed by: STUDENT IN AN ORGANIZED HEALTH CARE EDUCATION/TRAINING PROGRAM

## 2020-04-17 PROCEDURE — 85025 COMPLETE CBC W/AUTO DIFF WBC: CPT | Performed by: STUDENT IN AN ORGANIZED HEALTH CARE EDUCATION/TRAINING PROGRAM

## 2020-04-17 RX ORDER — TORSEMIDE 10 MG/1
10 TABLET ORAL DAILY
Status: DISCONTINUED | OUTPATIENT
Start: 2020-04-17 | End: 2020-04-20 | Stop reason: HOSPADM

## 2020-04-17 RX ORDER — ACETAMINOPHEN 325 MG/1
650 TABLET ORAL EVERY 6 HOURS PRN
Status: DISCONTINUED | OUTPATIENT
Start: 2020-04-17 | End: 2020-04-20 | Stop reason: HOSPADM

## 2020-04-17 RX ORDER — LISINOPRIL 5 MG/1
5 TABLET ORAL DAILY
Status: DISCONTINUED | OUTPATIENT
Start: 2020-04-18 | End: 2020-04-20 | Stop reason: HOSPADM

## 2020-04-17 RX ORDER — SODIUM CHLORIDE 9 MG/ML
50 INJECTION, SOLUTION INTRAVENOUS CONTINUOUS
Status: DISCONTINUED | OUTPATIENT
Start: 2020-04-17 | End: 2020-04-17

## 2020-04-17 RX ORDER — SODIUM CHLORIDE 9 MG/ML
50 INJECTION, SOLUTION INTRAVENOUS CONTINUOUS
Status: DISPENSED | OUTPATIENT
Start: 2020-04-17 | End: 2020-04-17

## 2020-04-17 RX ADMIN — METOPROLOL SUCCINATE 150 MG: 100 TABLET, EXTENDED RELEASE ORAL at 08:09

## 2020-04-17 RX ADMIN — ENOXAPARIN SODIUM 30 MG: 30 INJECTION SUBCUTANEOUS at 08:09

## 2020-04-17 RX ADMIN — TORSEMIDE 10 MG: 10 TABLET ORAL at 12:15

## 2020-04-17 RX ADMIN — LISINOPRIL 10 MG: 10 TABLET ORAL at 08:10

## 2020-04-17 RX ADMIN — AMPICILLIN AND SULBACTAM 3 G: 2; 1 INJECTION, POWDER, FOR SOLUTION INTRAMUSCULAR; INTRAVENOUS at 15:26

## 2020-04-17 RX ADMIN — ACETAMINOPHEN 650 MG: 325 TABLET ORAL at 15:16

## 2020-04-17 RX ADMIN — LEVOTHYROXINE SODIUM 150 MCG: 150 TABLET ORAL at 05:47

## 2020-04-17 RX ADMIN — INSULIN LISPRO 1 UNITS: 100 INJECTION, SOLUTION INTRAVENOUS; SUBCUTANEOUS at 16:58

## 2020-04-17 RX ADMIN — SODIUM CHLORIDE 50 ML/HR: 0.9 INJECTION, SOLUTION INTRAVENOUS at 12:11

## 2020-04-17 RX ADMIN — METOPROLOL SUCCINATE 150 MG: 100 TABLET, EXTENDED RELEASE ORAL at 17:16

## 2020-04-17 RX ADMIN — VITAMIN D, TAB 1000IU (100/BT) 2000 UNITS: 25 TAB at 08:10

## 2020-04-17 RX ADMIN — ASPIRIN 81 MG 81 MG: 81 TABLET ORAL at 08:09

## 2020-04-17 RX ADMIN — ISOSORBIDE MONONITRATE 30 MG: 30 TABLET, EXTENDED RELEASE ORAL at 08:09

## 2020-04-17 RX ADMIN — GLIPIZIDE 10 MG: 5 TABLET ORAL at 16:58

## 2020-04-17 RX ADMIN — INSULIN LISPRO 1 UNITS: 100 INJECTION, SOLUTION INTRAVENOUS; SUBCUTANEOUS at 22:18

## 2020-04-17 RX ADMIN — AMPICILLIN AND SULBACTAM 3 G: 2; 1 INJECTION, POWDER, FOR SOLUTION INTRAMUSCULAR; INTRAVENOUS at 02:10

## 2020-04-17 RX ADMIN — GLIPIZIDE 10 MG: 5 TABLET ORAL at 08:09

## 2020-04-17 RX ADMIN — ATORVASTATIN CALCIUM 40 MG: 40 TABLET, FILM COATED ORAL at 16:57

## 2020-04-18 LAB
ANION GAP SERPL CALCULATED.3IONS-SCNC: 12 MMOL/L (ref 4–13)
BACTERIA WND AEROBE CULT: ABNORMAL
BASOPHILS # BLD AUTO: 0.1 THOUSANDS/ΜL (ref 0–0.1)
BASOPHILS NFR BLD AUTO: 1 % (ref 0–1)
BUN SERPL-MCNC: 66 MG/DL (ref 5–25)
CALCIUM SERPL-MCNC: 9 MG/DL (ref 8.3–10.1)
CHLORIDE SERPL-SCNC: 104 MMOL/L (ref 100–108)
CO2 SERPL-SCNC: 17 MMOL/L (ref 21–32)
CREAT SERPL-MCNC: 2.89 MG/DL (ref 0.6–1.3)
EOSINOPHIL # BLD AUTO: 0.39 THOUSAND/ΜL (ref 0–0.61)
EOSINOPHIL NFR BLD AUTO: 4 % (ref 0–6)
ERYTHROCYTE [DISTWIDTH] IN BLOOD BY AUTOMATED COUNT: 14.2 % (ref 11.6–15.1)
GFR SERPL CREATININE-BSD FRML MDRD: 21 ML/MIN/1.73SQ M
GLUCOSE SERPL-MCNC: 111 MG/DL (ref 65–140)
GLUCOSE SERPL-MCNC: 225 MG/DL (ref 65–140)
GLUCOSE SERPL-MCNC: 225 MG/DL (ref 65–140)
GLUCOSE SERPL-MCNC: 70 MG/DL (ref 65–140)
GLUCOSE SERPL-MCNC: 99 MG/DL (ref 65–140)
GRAM STN SPEC: ABNORMAL
HCT VFR BLD AUTO: 40.5 % (ref 36.5–49.3)
HGB BLD-MCNC: 12.6 G/DL (ref 12–17)
IMM GRANULOCYTES # BLD AUTO: 0.04 THOUSAND/UL (ref 0–0.2)
IMM GRANULOCYTES NFR BLD AUTO: 0 % (ref 0–2)
LYMPHOCYTES # BLD AUTO: 1.25 THOUSANDS/ΜL (ref 0.6–4.47)
LYMPHOCYTES NFR BLD AUTO: 14 % (ref 14–44)
MAGNESIUM SERPL-MCNC: 2.4 MG/DL (ref 1.6–2.6)
MCH RBC QN AUTO: 29.7 PG (ref 26.8–34.3)
MCHC RBC AUTO-ENTMCNC: 31.1 G/DL (ref 31.4–37.4)
MCV RBC AUTO: 96 FL (ref 82–98)
MONOCYTES # BLD AUTO: 0.98 THOUSAND/ΜL (ref 0.17–1.22)
MONOCYTES NFR BLD AUTO: 11 % (ref 4–12)
NEUTROPHILS # BLD AUTO: 6.34 THOUSANDS/ΜL (ref 1.85–7.62)
NEUTS SEG NFR BLD AUTO: 70 % (ref 43–75)
NRBC BLD AUTO-RTO: 0 /100 WBCS
PHOSPHATE SERPL-MCNC: 4 MG/DL (ref 2.3–4.1)
PLATELET # BLD AUTO: 258 THOUSANDS/UL (ref 149–390)
PMV BLD AUTO: 11.1 FL (ref 8.9–12.7)
POTASSIUM SERPL-SCNC: 4.9 MMOL/L (ref 3.5–5.3)
RBC # BLD AUTO: 4.24 MILLION/UL (ref 3.88–5.62)
SODIUM SERPL-SCNC: 133 MMOL/L (ref 136–145)
WBC # BLD AUTO: 9.1 THOUSAND/UL (ref 4.31–10.16)

## 2020-04-18 PROCEDURE — 99233 SBSQ HOSP IP/OBS HIGH 50: CPT | Performed by: INTERNAL MEDICINE

## 2020-04-18 PROCEDURE — 85025 COMPLETE CBC W/AUTO DIFF WBC: CPT | Performed by: STUDENT IN AN ORGANIZED HEALTH CARE EDUCATION/TRAINING PROGRAM

## 2020-04-18 PROCEDURE — 80048 BASIC METABOLIC PNL TOTAL CA: CPT | Performed by: STUDENT IN AN ORGANIZED HEALTH CARE EDUCATION/TRAINING PROGRAM

## 2020-04-18 PROCEDURE — 99232 SBSQ HOSP IP/OBS MODERATE 35: CPT | Performed by: FAMILY MEDICINE

## 2020-04-18 PROCEDURE — 83735 ASSAY OF MAGNESIUM: CPT | Performed by: STUDENT IN AN ORGANIZED HEALTH CARE EDUCATION/TRAINING PROGRAM

## 2020-04-18 PROCEDURE — 99232 SBSQ HOSP IP/OBS MODERATE 35: CPT | Performed by: INTERNAL MEDICINE

## 2020-04-18 PROCEDURE — 82948 REAGENT STRIP/BLOOD GLUCOSE: CPT

## 2020-04-18 PROCEDURE — 84100 ASSAY OF PHOSPHORUS: CPT | Performed by: STUDENT IN AN ORGANIZED HEALTH CARE EDUCATION/TRAINING PROGRAM

## 2020-04-18 RX ORDER — OXYCODONE HYDROCHLORIDE AND ACETAMINOPHEN 5; 325 MG/1; MG/1
1 TABLET ORAL EVERY 6 HOURS PRN
Status: DISCONTINUED | OUTPATIENT
Start: 2020-04-18 | End: 2020-04-20 | Stop reason: HOSPADM

## 2020-04-18 RX ORDER — TRAMADOL HYDROCHLORIDE 50 MG/1
200 TABLET ORAL
Status: DISCONTINUED | OUTPATIENT
Start: 2020-04-18 | End: 2020-04-20 | Stop reason: HOSPADM

## 2020-04-18 RX ORDER — SODIUM BICARBONATE 650 MG/1
325 TABLET ORAL
Status: DISCONTINUED | OUTPATIENT
Start: 2020-04-18 | End: 2020-04-20

## 2020-04-18 RX ORDER — CIPROFLOXACIN 250 MG/1
250 TABLET, FILM COATED ORAL EVERY 24 HOURS
Status: DISCONTINUED | OUTPATIENT
Start: 2020-04-18 | End: 2020-04-20 | Stop reason: HOSPADM

## 2020-04-18 RX ADMIN — GLIPIZIDE 10 MG: 5 TABLET ORAL at 17:12

## 2020-04-18 RX ADMIN — OXYCODONE HYDROCHLORIDE AND ACETAMINOPHEN 1 TABLET: 5; 325 TABLET ORAL at 16:47

## 2020-04-18 RX ADMIN — SODIUM BICARBONATE 650 MG TABLET 325 MG: at 17:11

## 2020-04-18 RX ADMIN — GLIPIZIDE 10 MG: 5 TABLET ORAL at 07:05

## 2020-04-18 RX ADMIN — TORSEMIDE 10 MG: 10 TABLET ORAL at 08:53

## 2020-04-18 RX ADMIN — METOPROLOL SUCCINATE 150 MG: 100 TABLET, EXTENDED RELEASE ORAL at 17:15

## 2020-04-18 RX ADMIN — CIPROFLOXACIN HYDROCHLORIDE 250 MG: 250 TABLET, FILM COATED ORAL at 13:23

## 2020-04-18 RX ADMIN — VITAMIN D, TAB 1000IU (100/BT) 2000 UNITS: 25 TAB at 08:53

## 2020-04-18 RX ADMIN — INSULIN LISPRO 2 UNITS: 100 INJECTION, SOLUTION INTRAVENOUS; SUBCUTANEOUS at 17:11

## 2020-04-18 RX ADMIN — ASPIRIN 81 MG 81 MG: 81 TABLET ORAL at 08:56

## 2020-04-18 RX ADMIN — ATORVASTATIN CALCIUM 40 MG: 40 TABLET, FILM COATED ORAL at 17:12

## 2020-04-18 RX ADMIN — LEVOTHYROXINE SODIUM 150 MCG: 150 TABLET ORAL at 07:05

## 2020-04-18 RX ADMIN — AMPICILLIN AND SULBACTAM 3 G: 2; 1 INJECTION, POWDER, FOR SOLUTION INTRAMUSCULAR; INTRAVENOUS at 02:33

## 2020-04-18 RX ADMIN — SODIUM BICARBONATE 650 MG TABLET 325 MG: at 13:22

## 2020-04-18 RX ADMIN — INSULIN LISPRO 2 UNITS: 100 INJECTION, SOLUTION INTRAVENOUS; SUBCUTANEOUS at 13:23

## 2020-04-18 RX ADMIN — ENOXAPARIN SODIUM 30 MG: 30 INJECTION SUBCUTANEOUS at 08:54

## 2020-04-18 RX ADMIN — METOPROLOL SUCCINATE 150 MG: 100 TABLET, EXTENDED RELEASE ORAL at 08:54

## 2020-04-18 RX ADMIN — LISINOPRIL 5 MG: 5 TABLET ORAL at 08:53

## 2020-04-18 RX ADMIN — ISOSORBIDE MONONITRATE 30 MG: 30 TABLET, EXTENDED RELEASE ORAL at 08:53

## 2020-04-19 PROBLEM — L03.119 CELLULITIS OF LOWER EXTREMITY: Status: ACTIVE | Noted: 2020-04-15

## 2020-04-19 PROBLEM — N18.4 CKD (CHRONIC KIDNEY DISEASE) STAGE 4, GFR 15-29 ML/MIN (HCC): Status: ACTIVE | Noted: 2019-08-19

## 2020-04-19 LAB
ANION GAP SERPL CALCULATED.3IONS-SCNC: 10 MMOL/L (ref 4–13)
BASOPHILS # BLD AUTO: 0.06 THOUSANDS/ΜL (ref 0–0.1)
BASOPHILS NFR BLD AUTO: 1 % (ref 0–1)
BUN SERPL-MCNC: 66 MG/DL (ref 5–25)
CALCIUM SERPL-MCNC: 8.3 MG/DL (ref 8.3–10.1)
CHLORIDE SERPL-SCNC: 106 MMOL/L (ref 100–108)
CO2 SERPL-SCNC: 24 MMOL/L (ref 21–32)
CREAT SERPL-MCNC: 3.13 MG/DL (ref 0.6–1.3)
EOSINOPHIL # BLD AUTO: 0.25 THOUSAND/ΜL (ref 0–0.61)
EOSINOPHIL NFR BLD AUTO: 4 % (ref 0–6)
ERYTHROCYTE [DISTWIDTH] IN BLOOD BY AUTOMATED COUNT: 14.3 % (ref 11.6–15.1)
EST. AVERAGE GLUCOSE BLD GHB EST-MCNC: 169 MG/DL
GFR SERPL CREATININE-BSD FRML MDRD: 19 ML/MIN/1.73SQ M
GLUCOSE SERPL-MCNC: 102 MG/DL (ref 65–140)
GLUCOSE SERPL-MCNC: 123 MG/DL (ref 65–140)
GLUCOSE SERPL-MCNC: 148 MG/DL (ref 65–140)
GLUCOSE SERPL-MCNC: 155 MG/DL (ref 65–140)
GLUCOSE SERPL-MCNC: 233 MG/DL (ref 65–140)
GLUCOSE SERPL-MCNC: 58 MG/DL (ref 65–140)
GLUCOSE SERPL-MCNC: 74 MG/DL (ref 65–140)
HBA1C MFR BLD: 7.5 %
HCT VFR BLD AUTO: 32.8 % (ref 36.5–49.3)
HGB BLD-MCNC: 10.6 G/DL (ref 12–17)
IMM GRANULOCYTES # BLD AUTO: 0.03 THOUSAND/UL (ref 0–0.2)
IMM GRANULOCYTES NFR BLD AUTO: 0 % (ref 0–2)
LYMPHOCYTES # BLD AUTO: 1.57 THOUSANDS/ΜL (ref 0.6–4.47)
LYMPHOCYTES NFR BLD AUTO: 24 % (ref 14–44)
MAGNESIUM SERPL-MCNC: 2 MG/DL (ref 1.6–2.6)
MCH RBC QN AUTO: 30.3 PG (ref 26.8–34.3)
MCHC RBC AUTO-ENTMCNC: 32.3 G/DL (ref 31.4–37.4)
MCV RBC AUTO: 94 FL (ref 82–98)
MONOCYTES # BLD AUTO: 0.87 THOUSAND/ΜL (ref 0.17–1.22)
MONOCYTES NFR BLD AUTO: 13 % (ref 4–12)
NEUTROPHILS # BLD AUTO: 3.9 THOUSANDS/ΜL (ref 1.85–7.62)
NEUTS SEG NFR BLD AUTO: 58 % (ref 43–75)
NRBC BLD AUTO-RTO: 0 /100 WBCS
PHOSPHATE SERPL-MCNC: 3.6 MG/DL (ref 2.3–4.1)
PLATELET # BLD AUTO: 193 THOUSANDS/UL (ref 149–390)
PMV BLD AUTO: 11.2 FL (ref 8.9–12.7)
POTASSIUM SERPL-SCNC: 3.9 MMOL/L (ref 3.5–5.3)
RBC # BLD AUTO: 3.5 MILLION/UL (ref 3.88–5.62)
SODIUM SERPL-SCNC: 140 MMOL/L (ref 136–145)
WBC # BLD AUTO: 6.68 THOUSAND/UL (ref 4.31–10.16)

## 2020-04-19 PROCEDURE — 85025 COMPLETE CBC W/AUTO DIFF WBC: CPT | Performed by: STUDENT IN AN ORGANIZED HEALTH CARE EDUCATION/TRAINING PROGRAM

## 2020-04-19 PROCEDURE — 83036 HEMOGLOBIN GLYCOSYLATED A1C: CPT | Performed by: STUDENT IN AN ORGANIZED HEALTH CARE EDUCATION/TRAINING PROGRAM

## 2020-04-19 PROCEDURE — 82948 REAGENT STRIP/BLOOD GLUCOSE: CPT

## 2020-04-19 PROCEDURE — 97163 PT EVAL HIGH COMPLEX 45 MIN: CPT

## 2020-04-19 PROCEDURE — 99238 HOSP IP/OBS DSCHRG MGMT 30/<: CPT | Performed by: FAMILY MEDICINE

## 2020-04-19 PROCEDURE — 80048 BASIC METABOLIC PNL TOTAL CA: CPT | Performed by: STUDENT IN AN ORGANIZED HEALTH CARE EDUCATION/TRAINING PROGRAM

## 2020-04-19 PROCEDURE — 99233 SBSQ HOSP IP/OBS HIGH 50: CPT | Performed by: INTERNAL MEDICINE

## 2020-04-19 PROCEDURE — 99232 SBSQ HOSP IP/OBS MODERATE 35: CPT | Performed by: FAMILY MEDICINE

## 2020-04-19 PROCEDURE — 84100 ASSAY OF PHOSPHORUS: CPT | Performed by: STUDENT IN AN ORGANIZED HEALTH CARE EDUCATION/TRAINING PROGRAM

## 2020-04-19 PROCEDURE — 83735 ASSAY OF MAGNESIUM: CPT | Performed by: STUDENT IN AN ORGANIZED HEALTH CARE EDUCATION/TRAINING PROGRAM

## 2020-04-19 PROCEDURE — 97110 THERAPEUTIC EXERCISES: CPT

## 2020-04-19 RX ORDER — HEPARIN SODIUM 5000 [USP'U]/ML
5000 INJECTION, SOLUTION INTRAVENOUS; SUBCUTANEOUS EVERY 8 HOURS SCHEDULED
Status: DISCONTINUED | OUTPATIENT
Start: 2020-04-19 | End: 2020-04-20 | Stop reason: HOSPADM

## 2020-04-19 RX ADMIN — ASPIRIN 81 MG 81 MG: 81 TABLET ORAL at 09:40

## 2020-04-19 RX ADMIN — TORSEMIDE 10 MG: 10 TABLET ORAL at 09:40

## 2020-04-19 RX ADMIN — SODIUM BICARBONATE 650 MG TABLET 325 MG: at 18:09

## 2020-04-19 RX ADMIN — LEVOTHYROXINE SODIUM 150 MCG: 150 TABLET ORAL at 05:58

## 2020-04-19 RX ADMIN — OXYCODONE HYDROCHLORIDE AND ACETAMINOPHEN 1 TABLET: 5; 325 TABLET ORAL at 18:10

## 2020-04-19 RX ADMIN — ATORVASTATIN CALCIUM 40 MG: 40 TABLET, FILM COATED ORAL at 18:11

## 2020-04-19 RX ADMIN — LISINOPRIL 5 MG: 5 TABLET ORAL at 09:40

## 2020-04-19 RX ADMIN — METOPROLOL SUCCINATE 150 MG: 100 TABLET, EXTENDED RELEASE ORAL at 18:16

## 2020-04-19 RX ADMIN — CIPROFLOXACIN HYDROCHLORIDE 250 MG: 250 TABLET, FILM COATED ORAL at 11:54

## 2020-04-19 RX ADMIN — ACETAMINOPHEN 650 MG: 325 TABLET ORAL at 19:30

## 2020-04-19 RX ADMIN — INSULIN LISPRO 2 UNITS: 100 INJECTION, SOLUTION INTRAVENOUS; SUBCUTANEOUS at 21:32

## 2020-04-19 RX ADMIN — INSULIN LISPRO 1 UNITS: 100 INJECTION, SOLUTION INTRAVENOUS; SUBCUTANEOUS at 18:11

## 2020-04-19 RX ADMIN — VITAMIN D, TAB 1000IU (100/BT) 2000 UNITS: 25 TAB at 09:39

## 2020-04-19 RX ADMIN — OXYCODONE HYDROCHLORIDE AND ACETAMINOPHEN 1 TABLET: 5; 325 TABLET ORAL at 00:27

## 2020-04-19 RX ADMIN — HEPARIN SODIUM 5000 UNITS: 5000 INJECTION INTRAVENOUS; SUBCUTANEOUS at 14:52

## 2020-04-19 RX ADMIN — GLIPIZIDE 10 MG: 5 TABLET ORAL at 18:11

## 2020-04-19 RX ADMIN — OXYCODONE HYDROCHLORIDE AND ACETAMINOPHEN 1 TABLET: 5; 325 TABLET ORAL at 10:02

## 2020-04-19 RX ADMIN — METOPROLOL SUCCINATE 150 MG: 100 TABLET, EXTENDED RELEASE ORAL at 09:39

## 2020-04-19 RX ADMIN — HEPARIN SODIUM 5000 UNITS: 5000 INJECTION INTRAVENOUS; SUBCUTANEOUS at 10:03

## 2020-04-19 RX ADMIN — ISOSORBIDE MONONITRATE 30 MG: 30 TABLET, EXTENDED RELEASE ORAL at 09:40

## 2020-04-19 RX ADMIN — HEPARIN SODIUM 5000 UNITS: 5000 INJECTION INTRAVENOUS; SUBCUTANEOUS at 21:31

## 2020-04-19 RX ADMIN — SODIUM BICARBONATE 650 MG TABLET 325 MG: at 09:40

## 2020-04-20 ENCOUNTER — APPOINTMENT (INPATIENT)
Dept: RADIOLOGY | Facility: HOSPITAL | Age: 75
DRG: 280 | End: 2020-04-20
Payer: MEDICARE

## 2020-04-20 VITALS
BODY MASS INDEX: 33.75 KG/M2 | OXYGEN SATURATION: 99 % | TEMPERATURE: 98 F | RESPIRATION RATE: 16 BRPM | HEART RATE: 72 BPM | HEIGHT: 66 IN | WEIGHT: 210 LBS | DIASTOLIC BLOOD PRESSURE: 67 MMHG | SYSTOLIC BLOOD PRESSURE: 152 MMHG

## 2020-04-20 DIAGNOSIS — I10 HYPERTENSION, UNSPECIFIED TYPE: ICD-10-CM

## 2020-04-20 DIAGNOSIS — E11.65 TYPE 2 DIABETES MELLITUS WITH HYPERGLYCEMIA, WITHOUT LONG-TERM CURRENT USE OF INSULIN (HCC): ICD-10-CM

## 2020-04-20 DIAGNOSIS — I50.9 CHF (CONGESTIVE HEART FAILURE) (HCC): ICD-10-CM

## 2020-04-20 LAB
ANION GAP SERPL CALCULATED.3IONS-SCNC: 10 MMOL/L (ref 4–13)
BACTERIA BLD CULT: NORMAL
BUN SERPL-MCNC: 67 MG/DL (ref 5–25)
CALCIUM SERPL-MCNC: 8.4 MG/DL (ref 8.3–10.1)
CHLORIDE SERPL-SCNC: 106 MMOL/L (ref 100–108)
CO2 SERPL-SCNC: 25 MMOL/L (ref 21–32)
CREAT SERPL-MCNC: 3.2 MG/DL (ref 0.6–1.3)
ERYTHROCYTE [DISTWIDTH] IN BLOOD BY AUTOMATED COUNT: 14.2 % (ref 11.6–15.1)
GFR SERPL CREATININE-BSD FRML MDRD: 18 ML/MIN/1.73SQ M
GLUCOSE SERPL-MCNC: 121 MG/DL (ref 65–140)
GLUCOSE SERPL-MCNC: 130 MG/DL (ref 65–140)
GLUCOSE SERPL-MCNC: 56 MG/DL (ref 65–140)
GLUCOSE SERPL-MCNC: 62 MG/DL (ref 65–140)
HCT VFR BLD AUTO: 35 % (ref 36.5–49.3)
HGB BLD-MCNC: 11.3 G/DL (ref 12–17)
MAGNESIUM SERPL-MCNC: 2 MG/DL (ref 1.6–2.6)
MCH RBC QN AUTO: 30.1 PG (ref 26.8–34.3)
MCHC RBC AUTO-ENTMCNC: 32.3 G/DL (ref 31.4–37.4)
MCV RBC AUTO: 93 FL (ref 82–98)
PHOSPHATE SERPL-MCNC: 3.6 MG/DL (ref 2.3–4.1)
PLATELET # BLD AUTO: 219 THOUSANDS/UL (ref 149–390)
PMV BLD AUTO: 11 FL (ref 8.9–12.7)
POTASSIUM SERPL-SCNC: 3.9 MMOL/L (ref 3.5–5.3)
RBC # BLD AUTO: 3.75 MILLION/UL (ref 3.88–5.62)
SODIUM SERPL-SCNC: 141 MMOL/L (ref 136–145)
WBC # BLD AUTO: 7.59 THOUSAND/UL (ref 4.31–10.16)

## 2020-04-20 PROCEDURE — 80048 BASIC METABOLIC PNL TOTAL CA: CPT | Performed by: STUDENT IN AN ORGANIZED HEALTH CARE EDUCATION/TRAINING PROGRAM

## 2020-04-20 PROCEDURE — 97535 SELF CARE MNGMENT TRAINING: CPT

## 2020-04-20 PROCEDURE — 97167 OT EVAL HIGH COMPLEX 60 MIN: CPT

## 2020-04-20 PROCEDURE — 85027 COMPLETE CBC AUTOMATED: CPT | Performed by: STUDENT IN AN ORGANIZED HEALTH CARE EDUCATION/TRAINING PROGRAM

## 2020-04-20 PROCEDURE — 83735 ASSAY OF MAGNESIUM: CPT | Performed by: STUDENT IN AN ORGANIZED HEALTH CARE EDUCATION/TRAINING PROGRAM

## 2020-04-20 PROCEDURE — 82948 REAGENT STRIP/BLOOD GLUCOSE: CPT

## 2020-04-20 PROCEDURE — 93922 UPR/L XTREMITY ART 2 LEVELS: CPT | Performed by: SURGERY

## 2020-04-20 PROCEDURE — 93923 UPR/LXTR ART STDY 3+ LVLS: CPT

## 2020-04-20 PROCEDURE — 84100 ASSAY OF PHOSPHORUS: CPT | Performed by: STUDENT IN AN ORGANIZED HEALTH CARE EDUCATION/TRAINING PROGRAM

## 2020-04-20 PROCEDURE — 99232 SBSQ HOSP IP/OBS MODERATE 35: CPT | Performed by: INTERNAL MEDICINE

## 2020-04-20 PROCEDURE — 93925 LOWER EXTREMITY STUDY: CPT | Performed by: SURGERY

## 2020-04-20 PROCEDURE — 93925 LOWER EXTREMITY STUDY: CPT

## 2020-04-20 PROCEDURE — 99232 SBSQ HOSP IP/OBS MODERATE 35: CPT | Performed by: FAMILY MEDICINE

## 2020-04-20 RX ORDER — ASPIRIN 81 MG/1
324 TABLET, CHEWABLE ORAL DAILY
Status: DISCONTINUED | OUTPATIENT
Start: 2020-04-21 | End: 2020-04-20 | Stop reason: HOSPADM

## 2020-04-20 RX ORDER — METOPROLOL SUCCINATE 50 MG/1
150 TABLET, EXTENDED RELEASE ORAL 2 TIMES DAILY
Qty: 60 TABLET | Refills: 1 | Status: SHIPPED | OUTPATIENT
Start: 2020-04-20 | End: 2020-04-22 | Stop reason: SDUPTHER

## 2020-04-20 RX ORDER — POTASSIUM CHLORIDE 20 MEQ/1
40 TABLET, EXTENDED RELEASE ORAL ONCE
Status: COMPLETED | OUTPATIENT
Start: 2020-04-20 | End: 2020-04-20

## 2020-04-20 RX ORDER — GLIPIZIDE 5 MG/1
10 TABLET ORAL
Status: DISCONTINUED | OUTPATIENT
Start: 2020-04-21 | End: 2020-04-20 | Stop reason: HOSPADM

## 2020-04-20 RX ORDER — TORSEMIDE 10 MG/1
10 TABLET ORAL DAILY
Qty: 50 TABLET | Refills: 1 | Status: SHIPPED | OUTPATIENT
Start: 2020-04-21 | End: 2020-04-20

## 2020-04-20 RX ORDER — CIPROFLOXACIN 250 MG/1
250 TABLET, FILM COATED ORAL EVERY 24 HOURS
Qty: 7 TABLET | Refills: 0 | Status: SHIPPED | OUTPATIENT
Start: 2020-04-21 | End: 2020-04-28

## 2020-04-20 RX ORDER — ISOSORBIDE MONONITRATE 30 MG/1
TABLET, EXTENDED RELEASE ORAL
Qty: 90 TABLET | Refills: 0 | Status: SHIPPED | OUTPATIENT
Start: 2020-04-20 | End: 2020-04-27 | Stop reason: SDUPTHER

## 2020-04-20 RX ORDER — GLIPIZIDE 5 MG/1
5 TABLET ORAL EVERY EVENING
Qty: 50 TABLET | Refills: 1 | Status: SHIPPED | OUTPATIENT
Start: 2020-04-20 | End: 2020-04-20

## 2020-04-20 RX ORDER — ISOSORBIDE MONONITRATE 30 MG/1
30 TABLET, EXTENDED RELEASE ORAL DAILY
Qty: 50 TABLET | Refills: 1 | Status: SHIPPED | OUTPATIENT
Start: 2020-04-21 | End: 2020-04-20

## 2020-04-20 RX ORDER — TORSEMIDE 10 MG/1
TABLET ORAL
Qty: 90 TABLET | Refills: 0 | Status: SHIPPED | OUTPATIENT
Start: 2020-04-20 | End: 2020-07-08

## 2020-04-20 RX ORDER — GLIPIZIDE 10 MG/1
10 TABLET ORAL EVERY MORNING
Qty: 180 TABLET | Refills: 0 | Status: SHIPPED | OUTPATIENT
Start: 2020-04-20

## 2020-04-20 RX ORDER — GLIPIZIDE 5 MG/1
TABLET ORAL
Qty: 90 TABLET | Refills: 0 | Status: SHIPPED | OUTPATIENT
Start: 2020-04-20 | End: 2020-06-23

## 2020-04-20 RX ORDER — LISINOPRIL 5 MG/1
5 TABLET ORAL DAILY
Qty: 50 TABLET | Refills: 1 | Status: SHIPPED | OUTPATIENT
Start: 2020-04-21 | End: 2020-04-20

## 2020-04-20 RX ORDER — LISINOPRIL 5 MG/1
TABLET ORAL
Qty: 90 TABLET | Refills: 0 | Status: SHIPPED | OUTPATIENT
Start: 2020-04-20

## 2020-04-20 RX ORDER — GLIPIZIDE 5 MG/1
5 TABLET ORAL
Status: DISCONTINUED | OUTPATIENT
Start: 2020-04-20 | End: 2020-04-20 | Stop reason: HOSPADM

## 2020-04-20 RX ADMIN — CIPROFLOXACIN HYDROCHLORIDE 250 MG: 250 TABLET, FILM COATED ORAL at 11:04

## 2020-04-20 RX ADMIN — VITAMIN D, TAB 1000IU (100/BT) 2000 UNITS: 25 TAB at 09:53

## 2020-04-20 RX ADMIN — POTASSIUM CHLORIDE 40 MEQ: 1500 TABLET, EXTENDED RELEASE ORAL at 09:54

## 2020-04-20 RX ADMIN — METOPROLOL SUCCINATE 150 MG: 100 TABLET, EXTENDED RELEASE ORAL at 09:52

## 2020-04-20 RX ADMIN — CLOTRIMAZOLE AND BETAMETHASONE DIPROPIONATE: 10; .64 CREAM TOPICAL at 11:04

## 2020-04-20 RX ADMIN — HEPARIN SODIUM 5000 UNITS: 5000 INJECTION INTRAVENOUS; SUBCUTANEOUS at 05:52

## 2020-04-20 RX ADMIN — LEVOTHYROXINE SODIUM 150 MCG: 150 TABLET ORAL at 05:52

## 2020-04-20 RX ADMIN — GLIPIZIDE 10 MG: 5 TABLET ORAL at 09:55

## 2020-04-20 RX ADMIN — OXYCODONE HYDROCHLORIDE AND ACETAMINOPHEN 1 TABLET: 5; 325 TABLET ORAL at 06:05

## 2020-04-20 RX ADMIN — TORSEMIDE 10 MG: 10 TABLET ORAL at 09:51

## 2020-04-20 RX ADMIN — SODIUM BICARBONATE 650 MG TABLET 325 MG: at 09:53

## 2020-04-20 RX ADMIN — LISINOPRIL 5 MG: 5 TABLET ORAL at 09:51

## 2020-04-20 RX ADMIN — HEPARIN SODIUM 5000 UNITS: 5000 INJECTION INTRAVENOUS; SUBCUTANEOUS at 14:53

## 2020-04-20 RX ADMIN — ISOSORBIDE MONONITRATE 30 MG: 30 TABLET, EXTENDED RELEASE ORAL at 09:51

## 2020-04-21 ENCOUNTER — PATIENT OUTREACH (OUTPATIENT)
Dept: FAMILY MEDICINE CLINIC | Facility: CLINIC | Age: 75
End: 2020-04-21

## 2020-04-21 ENCOUNTER — TRANSITIONAL CARE MANAGEMENT (OUTPATIENT)
Dept: FAMILY MEDICINE CLINIC | Facility: CLINIC | Age: 75
End: 2020-04-21

## 2020-04-22 ENCOUNTER — TELEPHONE (OUTPATIENT)
Dept: FAMILY MEDICINE CLINIC | Facility: CLINIC | Age: 75
End: 2020-04-22

## 2020-04-22 ENCOUNTER — TELEPHONE (OUTPATIENT)
Dept: NEPHROLOGY | Facility: CLINIC | Age: 75
End: 2020-04-22

## 2020-04-22 DIAGNOSIS — I50.9 CHF (CONGESTIVE HEART FAILURE) (HCC): ICD-10-CM

## 2020-04-22 DIAGNOSIS — I10 HYPERTENSION, UNSPECIFIED TYPE: ICD-10-CM

## 2020-04-22 LAB
BACTERIA BLD CULT: ABNORMAL
GRAM STN SPEC: ABNORMAL

## 2020-04-22 RX ORDER — METOPROLOL SUCCINATE 50 MG/1
TABLET, EXTENDED RELEASE ORAL
Qty: 180 TABLET | Refills: 1 | Status: SHIPPED | OUTPATIENT
Start: 2020-04-22

## 2020-04-22 RX ORDER — METOPROLOL SUCCINATE 100 MG/1
TABLET, EXTENDED RELEASE ORAL
Qty: 180 TABLET | Refills: 1 | Status: SHIPPED | OUTPATIENT
Start: 2020-04-22 | End: 2020-06-23

## 2020-04-22 RX ORDER — METOPROLOL SUCCINATE 100 MG/1
TABLET, EXTENDED RELEASE ORAL
Qty: 90 TABLET | Refills: 1 | Status: SHIPPED | OUTPATIENT
Start: 2020-04-22 | End: 2020-04-22

## 2020-04-22 RX ORDER — METOPROLOL SUCCINATE 50 MG/1
150 TABLET, EXTENDED RELEASE ORAL 2 TIMES DAILY
Qty: 60 TABLET | Refills: 1 | Status: SHIPPED | OUTPATIENT
Start: 2020-04-22 | End: 2020-04-22 | Stop reason: SDUPTHER

## 2020-04-23 ENCOUNTER — TELEPHONE (OUTPATIENT)
Dept: FAMILY MEDICINE CLINIC | Facility: CLINIC | Age: 75
End: 2020-04-23

## 2020-04-24 ENCOUNTER — TELEPHONE (OUTPATIENT)
Dept: FAMILY MEDICINE CLINIC | Facility: CLINIC | Age: 75
End: 2020-04-24

## 2020-04-27 ENCOUNTER — TELEMEDICINE (OUTPATIENT)
Dept: FAMILY MEDICINE CLINIC | Facility: CLINIC | Age: 75
End: 2020-04-27
Payer: MEDICARE

## 2020-04-27 DIAGNOSIS — G89.29 CHRONIC LEFT-SIDED LOW BACK PAIN WITH LEFT-SIDED SCIATICA: ICD-10-CM

## 2020-04-27 DIAGNOSIS — I10 ESSENTIAL HYPERTENSION: ICD-10-CM

## 2020-04-27 DIAGNOSIS — I21.4 NSTEMI (NON-ST ELEVATED MYOCARDIAL INFARCTION) (HCC): ICD-10-CM

## 2020-04-27 DIAGNOSIS — N18.4 CKD (CHRONIC KIDNEY DISEASE) STAGE 4, GFR 15-29 ML/MIN (HCC): ICD-10-CM

## 2020-04-27 DIAGNOSIS — F41.9 ANXIETY AND DEPRESSION: ICD-10-CM

## 2020-04-27 DIAGNOSIS — I48.91 NEW ONSET ATRIAL FIBRILLATION (HCC): ICD-10-CM

## 2020-04-27 DIAGNOSIS — I10 HYPERTENSION, UNSPECIFIED TYPE: ICD-10-CM

## 2020-04-27 DIAGNOSIS — K59.01 SLOW TRANSIT CONSTIPATION: ICD-10-CM

## 2020-04-27 DIAGNOSIS — E11.65 TYPE 2 DIABETES MELLITUS WITH HYPERGLYCEMIA, WITHOUT LONG-TERM CURRENT USE OF INSULIN (HCC): Primary | ICD-10-CM

## 2020-04-27 DIAGNOSIS — I50.9 NEW ONSET OF CONGESTIVE HEART FAILURE (HCC): ICD-10-CM

## 2020-04-27 DIAGNOSIS — E63.9 NUTRITION IMPAIRED DUE TO IMBALANCE OF NUTRIENTS: ICD-10-CM

## 2020-04-27 DIAGNOSIS — M54.42 CHRONIC LEFT-SIDED LOW BACK PAIN WITH LEFT-SIDED SCIATICA: ICD-10-CM

## 2020-04-27 DIAGNOSIS — S80.819S: ICD-10-CM

## 2020-04-27 DIAGNOSIS — F32.A ANXIETY AND DEPRESSION: ICD-10-CM

## 2020-04-27 DIAGNOSIS — E66.09 CLASS 1 OBESITY DUE TO EXCESS CALORIES WITH SERIOUS COMORBIDITY AND BODY MASS INDEX (BMI) OF 34.0 TO 34.9 IN ADULT: ICD-10-CM

## 2020-04-27 PROCEDURE — 3066F NEPHROPATHY DOC TX: CPT | Performed by: FAMILY MEDICINE

## 2020-04-27 PROCEDURE — 1036F TOBACCO NON-USER: CPT | Performed by: FAMILY MEDICINE

## 2020-04-27 PROCEDURE — 99443 PR PHYS/QHP TELEPHONE EVALUATION 21-30 MIN: CPT | Performed by: FAMILY MEDICINE

## 2020-04-27 PROCEDURE — 3078F DIAST BP <80 MM HG: CPT | Performed by: FAMILY MEDICINE

## 2020-04-27 PROCEDURE — 3077F SYST BP >= 140 MM HG: CPT | Performed by: FAMILY MEDICINE

## 2020-04-27 PROCEDURE — 3051F HG A1C>EQUAL 7.0%<8.0%: CPT | Performed by: FAMILY MEDICINE

## 2020-04-27 PROCEDURE — 4040F PNEUMOC VAC/ADMIN/RCVD: CPT | Performed by: FAMILY MEDICINE

## 2020-04-27 RX ORDER — POLYETHYLENE GLYCOL 3350 17 G/17G
17 POWDER, FOR SOLUTION ORAL DAILY
Qty: 14 EACH | Refills: 0 | Status: SHIPPED | OUTPATIENT
Start: 2020-04-27

## 2020-04-27 RX ORDER — DOCUSATE SODIUM 100 MG/1
100 CAPSULE, LIQUID FILLED ORAL 2 TIMES DAILY
Qty: 30 CAPSULE | Refills: 3 | Status: SHIPPED | OUTPATIENT
Start: 2020-04-27

## 2020-04-27 RX ORDER — ISOSORBIDE MONONITRATE 30 MG/1
30 TABLET, EXTENDED RELEASE ORAL DAILY
Qty: 90 TABLET | Refills: 3 | Status: SHIPPED | OUTPATIENT
Start: 2020-04-27

## 2020-04-27 RX ORDER — ISOSORBIDE DINITRATE 30 MG/1
30 TABLET ORAL DAILY
COMMUNITY
Start: 2020-04-21 | End: 2020-04-27 | Stop reason: ALTCHOICE

## 2020-04-27 RX ORDER — ASPIRIN 81 MG/1
81 TABLET, CHEWABLE ORAL DAILY
COMMUNITY
Start: 2020-04-21 | End: 2020-04-27 | Stop reason: ALTCHOICE

## 2020-04-28 ENCOUNTER — TELEPHONE (OUTPATIENT)
Dept: FAMILY MEDICINE CLINIC | Facility: CLINIC | Age: 75
End: 2020-04-28

## 2020-05-05 ENCOUNTER — TELEPHONE (OUTPATIENT)
Dept: FAMILY MEDICINE CLINIC | Facility: CLINIC | Age: 75
End: 2020-05-05

## 2020-05-11 ENCOUNTER — TELEPHONE (OUTPATIENT)
Dept: FAMILY MEDICINE CLINIC | Facility: CLINIC | Age: 75
End: 2020-05-11

## 2020-05-12 ENCOUNTER — TELEMEDICINE (OUTPATIENT)
Dept: FAMILY MEDICINE CLINIC | Facility: CLINIC | Age: 75
End: 2020-05-12
Payer: MEDICARE

## 2020-05-12 DIAGNOSIS — D63.8 ANEMIA OF CHRONIC DISEASE: ICD-10-CM

## 2020-05-12 DIAGNOSIS — E66.09 CLASS 1 OBESITY DUE TO EXCESS CALORIES WITH SERIOUS COMORBIDITY AND BODY MASS INDEX (BMI) OF 34.0 TO 34.9 IN ADULT: ICD-10-CM

## 2020-05-12 DIAGNOSIS — F11.20 OPIOID TYPE DEPENDENCE, CONTINUOUS (HCC): ICD-10-CM

## 2020-05-12 DIAGNOSIS — M54.42 CHRONIC LEFT-SIDED LOW BACK PAIN WITH LEFT-SIDED SCIATICA: ICD-10-CM

## 2020-05-12 DIAGNOSIS — F32.A ANXIETY AND DEPRESSION: ICD-10-CM

## 2020-05-12 DIAGNOSIS — E11.65 TYPE 2 DIABETES MELLITUS WITH HYPERGLYCEMIA, WITHOUT LONG-TERM CURRENT USE OF INSULIN (HCC): Primary | ICD-10-CM

## 2020-05-12 DIAGNOSIS — F41.9 ANXIETY AND DEPRESSION: ICD-10-CM

## 2020-05-12 DIAGNOSIS — I21.4 NSTEMI (NON-ST ELEVATED MYOCARDIAL INFARCTION) (HCC): ICD-10-CM

## 2020-05-12 DIAGNOSIS — I48.91 NEW ONSET ATRIAL FIBRILLATION (HCC): ICD-10-CM

## 2020-05-12 DIAGNOSIS — I10 ESSENTIAL HYPERTENSION: ICD-10-CM

## 2020-05-12 DIAGNOSIS — N18.4 CKD (CHRONIC KIDNEY DISEASE) STAGE 4, GFR 15-29 ML/MIN (HCC): ICD-10-CM

## 2020-05-12 DIAGNOSIS — G89.29 CHRONIC LEFT-SIDED LOW BACK PAIN WITH LEFT-SIDED SCIATICA: ICD-10-CM

## 2020-05-12 PROBLEM — I50.42 CHRONIC COMBINED SYSTOLIC AND DIASTOLIC CONGESTIVE HEART FAILURE (HCC): Status: ACTIVE | Noted: 2020-05-12

## 2020-05-12 PROCEDURE — 99442 PR PHYS/QHP TELEPHONE EVALUATION 11-20 MIN: CPT | Performed by: FAMILY MEDICINE

## 2020-05-12 RX ORDER — DOCUSATE SODIUM 100 MG/1
100 CAPSULE, LIQUID FILLED ORAL 2 TIMES DAILY PRN
COMMUNITY
Start: 2020-04-27

## 2020-05-26 DIAGNOSIS — E03.9 HYPOTHYROIDISM, UNSPECIFIED TYPE: Primary | ICD-10-CM

## 2020-05-26 RX ORDER — LEVOTHYROXINE SODIUM 0.15 MG/1
TABLET ORAL
Qty: 90 TABLET | Refills: 3 | Status: SHIPPED | OUTPATIENT
Start: 2020-05-26

## 2020-05-28 ENCOUNTER — TELEPHONE (OUTPATIENT)
Dept: FAMILY MEDICINE CLINIC | Facility: CLINIC | Age: 75
End: 2020-05-28

## 2020-06-17 ENCOUNTER — EPISODE CHANGES (OUTPATIENT)
Dept: FAMILY MEDICINE CLINIC | Facility: CLINIC | Age: 75
End: 2020-06-17

## 2020-06-23 DIAGNOSIS — I50.9 CHF (CONGESTIVE HEART FAILURE) (HCC): ICD-10-CM

## 2020-06-23 DIAGNOSIS — M54.42 CHRONIC LEFT-SIDED LOW BACK PAIN WITH LEFT-SIDED SCIATICA: ICD-10-CM

## 2020-06-23 DIAGNOSIS — G89.29 CHRONIC LEFT-SIDED LOW BACK PAIN WITH LEFT-SIDED SCIATICA: ICD-10-CM

## 2020-06-23 DIAGNOSIS — I10 HYPERTENSION, UNSPECIFIED TYPE: ICD-10-CM

## 2020-06-23 DIAGNOSIS — E11.65 TYPE 2 DIABETES MELLITUS WITH HYPERGLYCEMIA, WITHOUT LONG-TERM CURRENT USE OF INSULIN (HCC): ICD-10-CM

## 2020-06-23 RX ORDER — METOPROLOL SUCCINATE 100 MG/1
TABLET, EXTENDED RELEASE ORAL
Qty: 60 TABLET | Refills: 1 | Status: SHIPPED | OUTPATIENT
Start: 2020-06-23 | End: 2020-06-24

## 2020-06-23 RX ORDER — GLIPIZIDE 5 MG/1
TABLET ORAL
Qty: 90 TABLET | Refills: 0 | Status: SHIPPED | OUTPATIENT
Start: 2020-06-23

## 2020-06-24 RX ORDER — METOPROLOL SUCCINATE 100 MG/1
TABLET, EXTENDED RELEASE ORAL
Qty: 180 TABLET | Refills: 1 | Status: SHIPPED | OUTPATIENT
Start: 2020-06-24 | End: 2020-07-16

## 2020-06-24 RX ORDER — TRAMADOL HYDROCHLORIDE 50 MG/1
TABLET ORAL
Qty: 120 TABLET | Refills: 0 | Status: SHIPPED | OUTPATIENT
Start: 2020-06-24

## 2020-07-08 ENCOUNTER — OFFICE VISIT (OUTPATIENT)
Dept: FAMILY MEDICINE CLINIC | Facility: CLINIC | Age: 75
End: 2020-07-08
Payer: MEDICARE

## 2020-07-08 ENCOUNTER — APPOINTMENT (OUTPATIENT)
Dept: LAB | Facility: HOSPITAL | Age: 75
End: 2020-07-08
Payer: MEDICARE

## 2020-07-08 VITALS
BODY MASS INDEX: 31.18 KG/M2 | WEIGHT: 194 LBS | HEART RATE: 73 BPM | DIASTOLIC BLOOD PRESSURE: 76 MMHG | OXYGEN SATURATION: 98 % | TEMPERATURE: 97.1 F | SYSTOLIC BLOOD PRESSURE: 144 MMHG | HEIGHT: 66 IN | RESPIRATION RATE: 18 BRPM

## 2020-07-08 DIAGNOSIS — I10 ESSENTIAL HYPERTENSION: ICD-10-CM

## 2020-07-08 DIAGNOSIS — E11.65 TYPE 2 DIABETES MELLITUS WITH HYPERGLYCEMIA, WITHOUT LONG-TERM CURRENT USE OF INSULIN (HCC): ICD-10-CM

## 2020-07-08 DIAGNOSIS — F32.A ANXIETY AND DEPRESSION: ICD-10-CM

## 2020-07-08 DIAGNOSIS — N18.4 CKD (CHRONIC KIDNEY DISEASE) STAGE 4, GFR 15-29 ML/MIN (HCC): ICD-10-CM

## 2020-07-08 DIAGNOSIS — E11.65 TYPE 2 DIABETES MELLITUS WITH HYPERGLYCEMIA, WITHOUT LONG-TERM CURRENT USE OF INSULIN (HCC): Primary | ICD-10-CM

## 2020-07-08 DIAGNOSIS — I10 HYPERTENSION, UNSPECIFIED TYPE: ICD-10-CM

## 2020-07-08 DIAGNOSIS — I50.9 CHF (CONGESTIVE HEART FAILURE) (HCC): ICD-10-CM

## 2020-07-08 DIAGNOSIS — D63.8 ANEMIA OF CHRONIC DISEASE: ICD-10-CM

## 2020-07-08 DIAGNOSIS — F41.9 ANXIETY AND DEPRESSION: ICD-10-CM

## 2020-07-08 DIAGNOSIS — E66.09 CLASS 1 OBESITY DUE TO EXCESS CALORIES WITH SERIOUS COMORBIDITY AND BODY MASS INDEX (BMI) OF 34.0 TO 34.9 IN ADULT: ICD-10-CM

## 2020-07-08 DIAGNOSIS — I50.42 CHRONIC COMBINED SYSTOLIC AND DIASTOLIC CONGESTIVE HEART FAILURE (HCC): ICD-10-CM

## 2020-07-08 PROBLEM — E78.2 MIXED HYPERLIPIDEMIA: Status: ACTIVE | Noted: 2020-03-19

## 2020-07-08 PROBLEM — I50.21 ACUTE SYSTOLIC CHF (CONGESTIVE HEART FAILURE) (HCC): Status: RESOLVED | Noted: 2020-04-15 | Resolved: 2020-07-08

## 2020-07-08 LAB
BASOPHILS # BLD AUTO: 0.07 THOUSANDS/ΜL (ref 0–0.1)
BASOPHILS NFR BLD AUTO: 1 % (ref 0–1)
EOSINOPHIL # BLD AUTO: 0.5 THOUSAND/ΜL (ref 0–0.61)
EOSINOPHIL NFR BLD AUTO: 7 % (ref 0–6)
ERYTHROCYTE [DISTWIDTH] IN BLOOD BY AUTOMATED COUNT: 17.2 % (ref 11.6–15.1)
EST. AVERAGE GLUCOSE BLD GHB EST-MCNC: 143 MG/DL
HBA1C MFR BLD: 6.6 %
HCT VFR BLD AUTO: 39.1 % (ref 36.5–49.3)
HGB BLD-MCNC: 11.5 G/DL (ref 12–17)
IMM GRANULOCYTES # BLD AUTO: 0.02 THOUSAND/UL (ref 0–0.2)
IMM GRANULOCYTES NFR BLD AUTO: 0 % (ref 0–2)
LYMPHOCYTES # BLD AUTO: 1.7 THOUSANDS/ΜL (ref 0.6–4.47)
LYMPHOCYTES NFR BLD AUTO: 23 % (ref 14–44)
MCH RBC QN AUTO: 29.3 PG (ref 26.8–34.3)
MCHC RBC AUTO-ENTMCNC: 29.4 G/DL (ref 31.4–37.4)
MCV RBC AUTO: 100 FL (ref 82–98)
MONOCYTES # BLD AUTO: 0.9 THOUSAND/ΜL (ref 0.17–1.22)
MONOCYTES NFR BLD AUTO: 12 % (ref 4–12)
NEUTROPHILS # BLD AUTO: 4.2 THOUSANDS/ΜL (ref 1.85–7.62)
NEUTS SEG NFR BLD AUTO: 57 % (ref 43–75)
NRBC BLD AUTO-RTO: 0 /100 WBCS
PLATELET # BLD AUTO: 228 THOUSANDS/UL (ref 149–390)
PMV BLD AUTO: 11.5 FL (ref 8.9–12.7)
RBC # BLD AUTO: 3.93 MILLION/UL (ref 3.88–5.62)
WBC # BLD AUTO: 7.39 THOUSAND/UL (ref 4.31–10.16)

## 2020-07-08 PROCEDURE — 83036 HEMOGLOBIN GLYCOSYLATED A1C: CPT

## 2020-07-08 PROCEDURE — 3078F DIAST BP <80 MM HG: CPT | Performed by: FAMILY MEDICINE

## 2020-07-08 PROCEDURE — 4040F PNEUMOC VAC/ADMIN/RCVD: CPT | Performed by: FAMILY MEDICINE

## 2020-07-08 PROCEDURE — 80048 BASIC METABOLIC PNL TOTAL CA: CPT

## 2020-07-08 PROCEDURE — 1160F RVW MEDS BY RX/DR IN RCRD: CPT | Performed by: FAMILY MEDICINE

## 2020-07-08 PROCEDURE — 85025 COMPLETE CBC W/AUTO DIFF WBC: CPT

## 2020-07-08 PROCEDURE — 3051F HG A1C>EQUAL 7.0%<8.0%: CPT | Performed by: FAMILY MEDICINE

## 2020-07-08 PROCEDURE — 99214 OFFICE O/P EST MOD 30 MIN: CPT | Performed by: FAMILY MEDICINE

## 2020-07-08 PROCEDURE — 3077F SYST BP >= 140 MM HG: CPT | Performed by: FAMILY MEDICINE

## 2020-07-08 PROCEDURE — 36415 COLL VENOUS BLD VENIPUNCTURE: CPT

## 2020-07-08 PROCEDURE — 1036F TOBACCO NON-USER: CPT | Performed by: FAMILY MEDICINE

## 2020-07-08 PROCEDURE — 3008F BODY MASS INDEX DOCD: CPT | Performed by: FAMILY MEDICINE

## 2020-07-08 PROCEDURE — 3066F NEPHROPATHY DOC TX: CPT | Performed by: FAMILY MEDICINE

## 2020-07-08 RX ORDER — TORSEMIDE 10 MG/1
20 TABLET ORAL DAILY
Qty: 180 TABLET | Refills: 1 | Status: SHIPPED | OUTPATIENT
Start: 2020-07-08

## 2020-07-08 NOTE — PROGRESS NOTES
SUBJECTIVE:  Chief complaint and HPI: Ap Maria is a 77 y o  male who presents for follow up of multiple chronic medical problems, as listed below in problem list  All problems are relatively stable except for the following issues: leg swelling and wrapped  Has had some CP requiring NTG last week, but not this week  Cardiologists may put in pacemaker once legs are healed  Painful to walk with his leg lesions, so didn't get labs  SOB only an issue with masks  Diet: needs to eat more veggies  Review of systems:  No fever/chills/sweats/unexplained weight loss  No change in digestion or bowel movements  No change in urination  No new musculoskeletal or neurological symptoms      Chart reviewed for relevant medical, surgical and psychosocial history, medications and allergies, labs and studies      Patient Active Problem List   Diagnosis    History of acute myocardial infarction    Type 2 diabetes mellitus with hyperglycemia, without long-term current use of insulin (HCC)    Chronic left-sided low back pain with left-sided sciatica    Class 1 obesity due to excess calories with serious comorbidity and body mass index (BMI) of 34 0 to 34 9 in adult    Hx of colonic polyps    CKD (chronic kidney disease) stage 4, GFR 15-29 ml/min (HCC)    Anxiety and depression    Nutrition impaired due to imbalance of nutrients    Opioid type dependence, continuous (HCC)    Mild episode of depression ([address] )    New onset of congestive heart failure ([address] )    NSTEMI (non-ST elevated myocardial infarction) ([address] )    Viral disease exposure    Diarrhea    Acute on chronic kidney failure ([address] )    Essential hypertension    Hyperlipidemia    Hypothyroidism    Ischemic cardiomyopathy    Cellulitis of lower extremity    Acute systolic CHF (congestive heart failure) ([address] )    New onset atrial fibrillation (HCC)    Chronic combined systolic and diastolic congestive heart failure (HCC)             EXAM:  /76 (BP Location: Left arm, Patient Position: Sitting, Cuff Size: Standard)   Pulse 73   Temp (!) 97 1 °F (36 2 °C) (Tympanic)   Resp 18   Ht 5' 6" (1 676 m)   Wt 88 kg (194 lb)   SpO2 98%   BMI 31 31 kg/m²   The patient appears well, in no apparent distress  Alert and oriented times three, pleasant and cooperative  Vital signs are as noted by the nurse  /76 (BP Location: Left arm, Patient Position: Sitting, Cuff Size: Standard)   Pulse 73   Temp (!) 97 1 °F (36 2 °C) (Tympanic)   Resp 18   Ht 5' 6" (1 676 m)   Wt 88 kg (194 lb)   SpO2 98%   BMI 31 31 kg/m²     Head: normocephalic, atraumatic  Eyes: well perfused conjunctiva, not clinically pale, not jaundiced  Ears: external ear: no gross lesions  Nose: no rhinorrhea  Neck: supple, trachea in the midline and no concerning cervical lymphadenopathy  Lungs: No respiratory labor  Clear to auscultation  Heart: Regular rate and rhythm, no murmurs, gallops or rubs  Abdomen: Benign: soft, non-tender, non-distended  No guarding or rebound  No masses or organomegaly  Normal bowel sounds,   Skin: No pallor  No rashes noted  Extremities: Moves all extremities normally  + pedal edema  Has shallow ulcers anterior L foreleg and R posterior lower calf  See photos  Re-dressed  These are grade 1 - early grade 2 and appear un-infected and healing  Has decrease monofilament and vibration, max  R foot      ASSESSMENT/PLAN:  1  Type 2 diabetes mellitus with hyperglycemia, without long-term current use of insulin ([address] )  Labs pending  Seems good control per pt report  Foot care reviewed  Changes dressings every 2 days  2  Chronic combined systolic and diastolic congestive heart failure (HCC)  Clinically compensated    3  Essential hypertension  Fair control  Increase Torsemide from 10 to 20 mg daily    4  CKD (chronic kidney disease) stage 4, GFR 15-29 ml/min ([facility])  montior    5  Anxiety and depression  stable    6   Class 1 obesity due to excess calories with serious comorbidity and body mass index (BMI) of 34 0 to 34 9 in adult  About same  Healthy diet encouraged      Reviewed and reinforced basics of healthy lifestyle  Risks and benefits of therapeutic plan discussed, answered all patient questions and concerns and patient expressed understanding and agreement of therapeutic plan  Follow up plan: 3 wks  He may be moving to [place] and getting another physician there

## 2020-07-09 LAB
ANION GAP SERPL CALCULATED.3IONS-SCNC: 5 MMOL/L (ref 4–13)
BUN SERPL-MCNC: 53 MG/DL (ref 5–25)
CALCIUM SERPL-MCNC: 9 MG/DL (ref 8.3–10.1)
CHLORIDE SERPL-SCNC: 112 MMOL/L (ref 100–108)
CO2 SERPL-SCNC: 24 MMOL/L (ref 21–32)
CREAT SERPL-MCNC: 2.45 MG/DL (ref 0.6–1.3)
GFR SERPL CREATININE-BSD FRML MDRD: 25 ML/MIN/1.73SQ M
GLUCOSE SERPL-MCNC: 118 MG/DL (ref 65–140)
POTASSIUM SERPL-SCNC: 5.3 MMOL/L (ref 3.5–5.3)
SODIUM SERPL-SCNC: 141 MMOL/L (ref 136–145)

## 2020-07-13 ENCOUNTER — TELEPHONE (OUTPATIENT)
Dept: OTHER | Facility: OTHER | Age: 75
End: 2020-07-13

## 2020-07-14 ENCOUNTER — TELEPHONE (OUTPATIENT)
Dept: OTHER | Facility: HOSPITAL | Age: 75
End: 2020-07-14

## 2020-07-14 NOTE — TELEPHONE ENCOUNTER
Had a golf ball blister on his L leg on Saturday, does not look infected  It burst and drained  Overall leg looks the same  He dresses it himself  He needs more dressings  [employer] Tel  1 [phone] is his supply source  Needs ointment and dressings  NO fever  Feels otherwise ok

## 2020-07-16 DIAGNOSIS — I50.9 CHF (CONGESTIVE HEART FAILURE) (HCC): ICD-10-CM

## 2020-07-16 DIAGNOSIS — I10 HYPERTENSION, UNSPECIFIED TYPE: ICD-10-CM

## 2020-07-16 RX ORDER — METOPROLOL SUCCINATE 100 MG/1
TABLET, EXTENDED RELEASE ORAL
Qty: 180 TABLET | Refills: 1 | Status: SHIPPED | OUTPATIENT
Start: 2020-07-16

## 2020-07-17 ENCOUNTER — TELEPHONE (OUTPATIENT)
Dept: FAMILY MEDICINE CLINIC | Facility: CLINIC | Age: 75
End: 2020-07-17

## 2020-07-17 DIAGNOSIS — L03.119 CELLULITIS OF LOWER EXTREMITY, UNSPECIFIED LATERALITY: Primary | ICD-10-CM

## 2020-07-17 RX ORDER — FOAM BANDAGE 4" X 4"
BANDAGE TOPICAL
Qty: 10 EACH | Refills: 10 | Status: SHIPPED | OUTPATIENT
Start: 2020-07-17

## 2020-07-17 RX ORDER — BLOOD PRESSURE TEST KIT
KIT MISCELLANEOUS
Qty: 10 EACH | Refills: 10 | Status: SHIPPED | OUTPATIENT
Start: 2020-07-17

## 2020-07-17 NOTE — TELEPHONE ENCOUNTER
Pharmacy called in regards to the scripts sent today for the gauze and bandages  She states that she doesn't carry them and most likely would have to purchase them OTC or get them through [employer] or [employer] [employer]

## 2020-07-17 NOTE — TELEPHONE ENCOUNTER
Please call pt and see if can work out what he uses that is covered in terms of dressing  Then pend to me to sign   Thanks, Dr Lorrie Melendez

## 2020-07-21 NOTE — TELEPHONE ENCOUNTER
Patient has moved to [place]  He plans on establishing with a new PCP in that area in the next few days  He had purchased the bandages OTC and has since developed a new ulcer on the left leg  He explained to me that it was approximately the size of a "baseball'  I recommended that he seek medical attention in his [place] [facility] to start to take care of that new wound and continue his current medications without interruption

## 2020-09-02 DIAGNOSIS — E11.65 TYPE 2 DIABETES MELLITUS WITH HYPERGLYCEMIA, WITHOUT LONG-TERM CURRENT USE OF INSULIN (HCC): ICD-10-CM

## 2020-09-02 RX ORDER — FLASH GLUCOSE SENSOR
KIT MISCELLANEOUS
Qty: 1 EACH | Refills: 11 | Status: SHIPPED | OUTPATIENT
Start: 2020-09-02

## 2021-11-02 NOTE — TELEPHONE ENCOUNTER
Please offer pt 9/4 at 930am with Dr Floresita Jones at [place] left lower extremity angiogram possible endovascular revascularization
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
